# Patient Record
Sex: MALE | Race: BLACK OR AFRICAN AMERICAN | Employment: FULL TIME | URBAN - METROPOLITAN AREA
[De-identification: names, ages, dates, MRNs, and addresses within clinical notes are randomized per-mention and may not be internally consistent; named-entity substitution may affect disease eponyms.]

---

## 2022-02-07 ENCOUNTER — OFFICE VISIT (OUTPATIENT)
Dept: FAMILY MEDICINE CLINIC | Age: 47
End: 2022-02-07
Payer: COMMERCIAL

## 2022-02-07 VITALS
TEMPERATURE: 97.3 F | SYSTOLIC BLOOD PRESSURE: 139 MMHG | WEIGHT: 263.8 LBS | DIASTOLIC BLOOD PRESSURE: 79 MMHG | HEART RATE: 79 BPM | OXYGEN SATURATION: 100 % | BODY MASS INDEX: 34.96 KG/M2 | HEIGHT: 73 IN

## 2022-02-07 DIAGNOSIS — Z13.29 SCREENING FOR THYROID DISORDER: ICD-10-CM

## 2022-02-07 DIAGNOSIS — M54.50 CHRONIC BILATERAL LOW BACK PAIN WITHOUT SCIATICA: ICD-10-CM

## 2022-02-07 DIAGNOSIS — Z11.59 NEED FOR HEPATITIS C SCREENING TEST: ICD-10-CM

## 2022-02-07 DIAGNOSIS — E53.9 VITAMIN B DEFICIENCY: ICD-10-CM

## 2022-02-07 DIAGNOSIS — Z23 NEED FOR PNEUMOCOCCAL VACCINATION: ICD-10-CM

## 2022-02-07 DIAGNOSIS — Z71.6 TOBACCO ABUSE COUNSELING: ICD-10-CM

## 2022-02-07 DIAGNOSIS — G47.09 OTHER INSOMNIA: ICD-10-CM

## 2022-02-07 DIAGNOSIS — G89.29 CHRONIC BILATERAL LOW BACK PAIN WITHOUT SCIATICA: ICD-10-CM

## 2022-02-07 DIAGNOSIS — Z12.11 SCREEN FOR COLON CANCER: Primary | ICD-10-CM

## 2022-02-07 DIAGNOSIS — Z00.00 VISIT FOR ANNUAL HEALTH EXAMINATION: ICD-10-CM

## 2022-02-07 DIAGNOSIS — Z12.5 SCREENING FOR MALIGNANT NEOPLASM OF PROSTATE: ICD-10-CM

## 2022-02-07 DIAGNOSIS — R73.9 HYPERGLYCEMIA: ICD-10-CM

## 2022-02-07 DIAGNOSIS — Z72.0 TOBACCO ABUSE DISORDER: ICD-10-CM

## 2022-02-07 DIAGNOSIS — Z11.4 ENCOUNTER FOR SCREENING FOR HIV: ICD-10-CM

## 2022-02-07 DIAGNOSIS — R51.9 CHRONIC NONINTRACTABLE HEADACHE, UNSPECIFIED HEADACHE TYPE: ICD-10-CM

## 2022-02-07 DIAGNOSIS — E66.9 CLASS 1 OBESITY WITH BODY MASS INDEX (BMI) OF 34.0 TO 34.9 IN ADULT, UNSPECIFIED OBESITY TYPE, UNSPECIFIED WHETHER SERIOUS COMORBIDITY PRESENT: ICD-10-CM

## 2022-02-07 DIAGNOSIS — G89.29 CHRONIC NONINTRACTABLE HEADACHE, UNSPECIFIED HEADACHE TYPE: ICD-10-CM

## 2022-02-07 DIAGNOSIS — Z13.220 SCREENING, LIPID: ICD-10-CM

## 2022-02-07 DIAGNOSIS — E55.9 VITAMIN D DEFICIENCY: ICD-10-CM

## 2022-02-07 PROBLEM — E66.811 CLASS 1 OBESITY IN ADULT: Status: ACTIVE | Noted: 2022-02-07

## 2022-02-07 PROCEDURE — 99204 OFFICE O/P NEW MOD 45 MIN: CPT | Performed by: FAMILY MEDICINE

## 2022-02-07 PROCEDURE — 90732 PPSV23 VACC 2 YRS+ SUBQ/IM: CPT | Performed by: FAMILY MEDICINE

## 2022-02-07 PROCEDURE — 90471 IMMUNIZATION ADMIN: CPT | Performed by: FAMILY MEDICINE

## 2022-02-07 RX ORDER — CHOLECALCIFEROL (VITAMIN D3) 125 MCG
CAPSULE ORAL
COMMUNITY
Start: 2022-02-04

## 2022-02-07 SDOH — ECONOMIC STABILITY: FOOD INSECURITY: WITHIN THE PAST 12 MONTHS, YOU WORRIED THAT YOUR FOOD WOULD RUN OUT BEFORE YOU GOT MONEY TO BUY MORE.: SOMETIMES TRUE

## 2022-02-07 SDOH — ECONOMIC STABILITY: FOOD INSECURITY: WITHIN THE PAST 12 MONTHS, THE FOOD YOU BOUGHT JUST DIDN'T LAST AND YOU DIDN'T HAVE MONEY TO GET MORE.: SOMETIMES TRUE

## 2022-02-07 ASSESSMENT — PATIENT HEALTH QUESTIONNAIRE - PHQ9
SUM OF ALL RESPONSES TO PHQ9 QUESTIONS 1 & 2: 0
1. LITTLE INTEREST OR PLEASURE IN DOING THINGS: 0
SUM OF ALL RESPONSES TO PHQ QUESTIONS 1-9: 0
SUM OF ALL RESPONSES TO PHQ9 QUESTIONS 1 & 2: 0
SUM OF ALL RESPONSES TO PHQ QUESTIONS 1-9: 0
1. LITTLE INTEREST OR PLEASURE IN DOING THINGS: 0
SUM OF ALL RESPONSES TO PHQ QUESTIONS 1-9: 0
2. FEELING DOWN, DEPRESSED OR HOPELESS: 0
2. FEELING DOWN, DEPRESSED OR HOPELESS: 0
SUM OF ALL RESPONSES TO PHQ QUESTIONS 1-9: 0

## 2022-02-07 ASSESSMENT — ENCOUNTER SYMPTOMS
RHINORRHEA: 0
EYE DISCHARGE: 0
VOICE CHANGE: 0
SORE THROAT: 0
CHEST TIGHTNESS: 0
SINUS PRESSURE: 0
SHORTNESS OF BREATH: 0
CONSTIPATION: 0
VOMITING: 0
DIARRHEA: 0
NAUSEA: 0
BACK PAIN: 1
EYE PAIN: 0
ABDOMINAL DISTENTION: 0
ABDOMINAL PAIN: 0
COLOR CHANGE: 0

## 2022-02-07 ASSESSMENT — SOCIAL DETERMINANTS OF HEALTH (SDOH): HOW HARD IS IT FOR YOU TO PAY FOR THE VERY BASICS LIKE FOOD, HOUSING, MEDICAL CARE, AND HEATING?: NOT HARD AT ALL

## 2022-02-07 NOTE — PATIENT INSTRUCTIONS
Patient Education        Learning About High Cholesterol  What is high cholesterol? High cholesterol means that you have too much cholesterol in your blood. Cholesterol is a type of fat. It's needed for many body functions, such as making new cells. Cholesterol is made by your body. It also comes from food you eat. Having high cholesterol can lead to the buildup of plaque in artery walls. This can increase your risk of heart attack and stroke. When your doctor talks about high cholesterol levels, your doctor is talking about your total cholesterol and LDL cholesterol (the \"bad\" cholesterol) levels. Your doctor may also speak about HDL (the \"good\" cholesterol) levels. High HDL is linked with a lower risk for coronary artery disease, heart attack, and stroke. Your cholesterol levels help your doctor find out your risk for having a heart attack or stroke. How can you help prevent high cholesterol? A heart-healthy lifestyle can help you prevent high cholesterol and lower your risk for a heart attack and stroke. · Eat heart-healthy foods. ? Eat fruits, vegetables, whole grains, beans, and other high-fiber foods. ? Eat lean proteins, such as seafood, lean meats, beans, nuts, and soy products. ? Eat healthy fats, such as canola and olive oil. ? Choose foods that are low in saturated fat. ? Limit sodium and alcohol. ? Limit drinks and foods with added sugar. · Be active. Try to do moderate activity at least 2½ hours a week. Or try vigorous activity at least 1¼ hours a week. You may want to walk or try other activities, such as running, swimming, cycling, or playing tennis or team sports. · Stay at a healthy weight. Lose weight if you need to. · Don't smoke. If you need help quitting, talk to your doctor about stop-smoking programs and medicines. These can increase your chances of quitting for good. How is high cholesterol treated?   The goal of treatment is to reduce your chances of having a heart attack or stroke. The goal is not to lower your cholesterol numbers only. · Have a heart-healthy lifestyle. This includes eating healthy foods, not smoking, losing weight, and being more active. · You may choose to take medicine. Follow-up care is a key part of your treatment and safety. Be sure to make and go to all appointments, and call your doctor if you are having problems. It's also a good idea to know your test results and keep a list of the medicines you take. Where can you learn more? Go to [email or web address] [email or web address]. org and sign in to your [employer] account. Enter J005 in the [employer] box to learn more about \"Learning About High Cholesterol. \"     If you do not have an account, please click on the \"Sign Up Now\" link. Current as of: April 29, 2021               Content Version: 13.1  © 8936-3005 Healthwise, Incorporated. Care instructions adapted under license by [facility] ([facility]). If you have questions about a medical condition or this instruction, always ask your healthcare professional. [address] any warranty or liability for your use of this information.

## 2022-02-07 NOTE — PROGRESS NOTES
Subjective:      Patient ID: Cj Abdalla is a 55 y.o. male. [facility] in [place] not seen a PCP for a long time. Was in [place] -  has had back problems since oct 4 th 2020. States during arrest he hurt his back. He was seen in prison by physicians at prison and given motrin. States when he moves a certain way feels like painis worse and feels back is collapsing. Pain is to the left or the right to lower back but does not go down his legs  Smoker for past 20 years- [facility] 1/2 PPD x last 20 years. States currently not on drugs , but 4 months ago used meth   States in the past used coccaine as well. Last used cocciane 7 months ago. Mood,  appetite ok.  goes to [address] and goes to the treatment center every day.  has been prescribed melatonin for his disturbed sleep.  never used alcohol in the past except socially. FH dad  of MI at age 55 years. Mom had manic depressive disorder -  at 61 years - had a stroke and sepsis. 2 older brothers are healthy as far as he knows. No problems with bowels or bladder. H/O migraines- [facility] has had several concussions in the past -during football games- [facility] started with headaches minor ones , but after police arrest the HA are more severe. [facility] [place] also has had several back injuries when playing football. [facility] [place] used to see Dr Anne Rueda- prescribed neurontin as well as naproxen for headaches, offered botox but he did not try yet.  used to go to Hospital for back as well as headaches was before his arrest.    [facility] [place] last may was prescribed medical marijuana in [facility] for chronic pain. [facility] [place] has a medical marijuana card. Was prescribed by a physician in [facility]. [facility] [place] back pain is all day every day worse in the am, Headaches are every few days - once or twice a week- typically towards the evening , B/L temple areas, no other sx associated with this.   Sometimes moved to nose and sinus pain, sometimes had dizziness and restlessness, states sometimes sees Stars- bad  HA is once every couple of weeks states gets irritable when he has HA. Review of Systems   Constitutional: Negative for activity change, appetite change and fatigue. HENT: Negative for congestion, dental problem, hearing loss, rhinorrhea, sinus pressure, sneezing, sore throat, tinnitus and voice change. Eyes: Negative for pain, discharge and visual disturbance. Respiratory: Negative for chest tightness and shortness of breath. Cardiovascular: Negative for chest pain and palpitations. Gastrointestinal: Negative for abdominal distention, abdominal pain, constipation, diarrhea, nausea and vomiting. Endocrine: Negative for cold intolerance and heat intolerance. Genitourinary: Negative for decreased urine volume, difficulty urinating, frequency and urgency. Musculoskeletal: Positive for back pain. Negative for arthralgias, gait problem, joint swelling and myalgias. Skin: Negative for color change, pallor and rash. Allergic/Immunologic: Negative for environmental allergies and food allergies. Neurological: Positive for headaches. Negative for dizziness, tremors and weakness. Hematological: Negative for adenopathy. Does not bruise/bleed easily. Psychiatric/Behavioral: Negative for agitation, behavioral problems and sleep disturbance. The patient is not nervous/anxious. Objective:   Physical Exam  Constitutional:       General: He is not in acute distress. HENT:      Head: Normocephalic and atraumatic. Right Ear: External ear normal.      Left Ear: External ear normal.      Mouth/Throat:      Mouth: Mucous membranes are moist.   Eyes:      Conjunctiva/sclera: Conjunctivae normal.      Pupils: Pupils are equal, round, and reactive to light. Neck:      Thyroid: No thyromegaly. Trachea: No tracheal deviation. Cardiovascular:      Rate and Rhythm: Normal rate and regular rhythm. Pulses: Normal pulses. Heart sounds: Normal heart sounds. No murmur heard. No friction rub. No gallop. Pulmonary:      Effort: Pulmonary effort is normal. No respiratory distress. Breath sounds: Normal breath sounds. No stridor. No wheezing or rales. Chest:      Chest wall: No tenderness. Abdominal:      General: Bowel sounds are normal. There is no distension. Palpations: Abdomen is soft. Tenderness: There is no abdominal tenderness. There is no rebound. Musculoskeletal:         General: Normal range of motion. Cervical back: Normal range of motion. Comments: Able to heel and toe walk, ROM back WNL- no neurologic deficit   Lymphadenopathy:      Cervical: No cervical adenopathy. Skin:     General: Skin is warm. Coloration: Skin is not pale. Findings: No erythema or rash. Neurological:      General: No focal deficit present. Mental Status: He is alert and oriented to person, place, and time. Mental status is at baseline. Cranial Nerves: No cranial nerve deficit. Motor: No abnormal muscle tone. Deep Tendon Reflexes: Reflexes normal.   Psychiatric:         Mood and Affect: Mood normal.         Behavior: Behavior normal.         Thought Content: Thought content normal.         Judgment: Judgment normal.          Vitals:    02/07/22 1334   BP: 139/79   Pulse: 79   Temp: 97.3 °F (36.3 °C)   SpO2: 100%         Assessment:      Diagnosis Orders   1. Screen for colon cancer     2. Visit for annual health examination     3. Tobacco abuse disorder     4. Tobacco abuse counseling     5. Screening for malignant neoplasm of prostate     6. Screening, lipid     7. Screening for thyroid disorder     8. Vitamin D deficiency     9. Vitamin B deficiency     10. Hyperglycemia     11. Encounter for screening for HIV     12. Need for hepatitis C screening test     13.  Class 1 obesity with body mass index (BMI) of 34.0 to 34.9 in adult, unspecified obesity type, unspecified whether serious comorbidity present           Plan:     Orders Placed This Encounter   Procedures    Pneumococcal polysaccharide vaccine 23-valent greater than or equal to 3yo subcutaneous/IM    CBC    Comprehensive Metabolic Panel    Folate    Hemoglobin A1C    Hepatitis C Antibody    HIV Screen    Lipid Panel    PSA screening    TSH without Reflex    Vitamin B12    Vitamin D 25 Hydroxy    Kera Erazo MD, Gastroenterology, [address]   [address], Neurology, [facility]       Outpatient Encounter Medications as of 2/7/2022   Medication Sig Dispense Refill    melatonin 5 MG TABS tablet       ibuprofen (IBU) 600 MG tablet Take 1 tablet by mouth every 6 hours as needed for Pain. 30 tablet 0    [DISCONTINUED] naproxen (NAPROSYN) 500 MG tablet Take 500 mg by mouth as needed. (Patient not taking: Reported on 2/7/2022)       No facility-administered encounter medications on file as of 2/7/2022.     15 minutes spent with patient counseling/educating on acute/chronic medical health problems, medications,  along with treatment options. Reviewed multiple labs/imaging/medications with patient during this time. Following Diet discussion/education was done on Cholesterol Diet . In addition Exercise plan and depression screen were discussed. Recent labs/imaging were discussed and reviewed with patient.

## 2022-02-19 ENCOUNTER — TELEPHONE (OUTPATIENT)
Dept: GASTROENTEROLOGY | Age: 47
End: 2022-02-19

## 2022-03-09 PROBLEM — Z00.00 VISIT FOR ANNUAL HEALTH EXAMINATION: Status: RESOLVED | Noted: 2022-02-07 | Resolved: 2022-03-09

## 2022-05-11 ENCOUNTER — TELEPHONE (OUTPATIENT)
Dept: FAMILY MEDICINE CLINIC | Age: 47
End: 2022-05-11

## 2022-10-05 ENCOUNTER — HOSPITAL ENCOUNTER (EMERGENCY)
Facility: CLINIC | Age: 47
Discharge: HOME | End: 2022-10-05
Attending: EMERGENCY MEDICINE
Payer: COMMERCIAL

## 2022-10-05 ENCOUNTER — APPOINTMENT (OUTPATIENT)
Dept: GENERAL RADIOLOGY | Facility: CLINIC | Age: 47
End: 2022-10-05
Payer: COMMERCIAL

## 2022-10-05 VITALS
HEART RATE: 78 BPM | RESPIRATION RATE: 17 BRPM | OXYGEN SATURATION: 98 % | TEMPERATURE: 98.2 F | DIASTOLIC BLOOD PRESSURE: 92 MMHG | WEIGHT: 237 LBS | SYSTOLIC BLOOD PRESSURE: 115 MMHG | BODY MASS INDEX: 31.27 KG/M2

## 2022-10-05 DIAGNOSIS — S39.012A STRAIN OF LUMBAR REGION, INITIAL ENCOUNTER: Primary | ICD-10-CM

## 2022-10-05 PROCEDURE — 99284 EMERGENCY DEPT VISIT MOD MDM: CPT

## 2022-10-05 PROCEDURE — 96372 THER/PROPH/DIAG INJ SC/IM: CPT

## 2022-10-05 PROCEDURE — 6360000002 HC RX W HCPCS: Performed by: EMERGENCY MEDICINE

## 2022-10-05 PROCEDURE — 72100 X-RAY EXAM L-S SPINE 2/3 VWS: CPT

## 2022-10-05 RX ORDER — METHOCARBAMOL 500 MG/1
500 TABLET, FILM COATED ORAL 3 TIMES DAILY PRN
Qty: 15 TABLET | Refills: 0 | Status: SHIPPED | OUTPATIENT
Start: 2022-10-05 | End: 2022-10-10

## 2022-10-05 RX ORDER — KETOROLAC TROMETHAMINE 30 MG/ML
60 INJECTION, SOLUTION INTRAMUSCULAR; INTRAVENOUS ONCE
Status: COMPLETED | OUTPATIENT
Start: 2022-10-05 | End: 2022-10-05

## 2022-10-05 RX ORDER — KETOROLAC TROMETHAMINE 10 MG/1
10 TABLET, FILM COATED ORAL EVERY 6 HOURS PRN
Qty: 20 TABLET | Refills: 0 | Status: SHIPPED | OUTPATIENT
Start: 2022-10-05 | End: 2022-10-11

## 2022-10-05 RX ORDER — PREDNISONE 10 MG/1
10 TABLET ORAL SEE ADMIN INSTRUCTIONS
Qty: 17 TABLET | Refills: 0 | Status: SHIPPED | OUTPATIENT
Start: 2022-10-05 | End: 2022-10-11

## 2022-10-05 RX ADMIN — KETOROLAC TROMETHAMINE 60 MG: 30 INJECTION, SOLUTION INTRAMUSCULAR at 14:19

## 2022-10-05 ASSESSMENT — PAIN SCALES - GENERAL
PAINLEVEL_OUTOF10: 3
PAINLEVEL_OUTOF10: 10

## 2022-10-05 ASSESSMENT — ENCOUNTER SYMPTOMS: BACK PAIN: 1

## 2022-10-05 NOTE — ED PROVIDER NOTES
[facility]  [address] [facility]  Phone: [facility]  EMERGENCY DEPARTMENT ENCOUNTER      Pt Name: Renea Rankin  MRN: [id number]  [place] 1975  Date of evaluation: 10/5/2022  Provider: Smiley Garces DO    CHIEF COMPLAINT       Chief Complaint   Patient presents with    Back Pain     Last 4 weeks worsening          HISTORY OF PRESENT ILLNESS   (Location/Symptom, Timing/Onset,Context/Setting, Quality, Duration, Modifying Factors, Severity)  Note limiting factors. Renea Rankin is a 52 y.o. male who presents to the emergency department for the evaluation of back pain. Patient states this is been going on for about a year. Patient states that he feels he injured it about that time 1 year ago. States that since that time he has had some intermittent episodes, there are times it flares up and he has to lock his back out straight just to walk. He says most days it feels fine at work but when he gets home and sits down it tightens up. He is not having numbness or weakness in his legs. No bowel or bladder incontinence. He is taken some over-the-counter medications at times without much relief. No recent travel. He did initially see his primary care doctor for this but they did not want to order any x-rays and he feels like he would like to see somebody else so he presents here. Nursing Notes were reviewed. REVIEW OF SYSTEMS    (2-9systems for level 4, 10 or more for level 5)     Review of Systems   Constitutional:  Negative for fever. Musculoskeletal:  Positive for back pain. Neurological:  Negative for weakness. Except asnoted above the remainder of the review of systems was reviewed and negative. PAST MEDICAL HISTORY     Past Medical History:   Diagnosis Date    Substance abuse ([address]          SURGICAL HISTORY     No past surgical history on file.       CURRENT MEDICATIONS     Previous Medications    IBUPROFEN (IBU) 600 MG TABLET normal. No respiratory distress. Breath sounds: Normal breath sounds. No wheezing. Abdominal:      General: Abdomen is flat. There is no distension. Palpations: Abdomen is soft. There is no pulsatile mass. Tenderness: no abdominal tenderness There is no guarding or rebound. Comments: No pulsatile mass   Musculoskeletal:         General: Tenderness present. No deformity or signs of injury. Normal range of motion. Cervical back: Normal range of motion. Comments: Minor tenderness bilaterally in the paralumbar muscles near L4/L5 as well as bilateral PSIS. No midline tenderness, step-off or deformity in the lumbar spine   Skin:     General: Skin is warm and dry. Capillary Refill: Capillary refill takes less than 2 seconds. Findings: No rash. Neurological:      General: No focal deficit present. Mental Status: He is alert. Mental status is at baseline. Motor: No weakness. Comments: Speaking normally. No facial asymmetry. Moving all 4 extremities. Normal gait. Psychiatric:         Mood and Affect: Mood normal.       EMERGENCY DEPARTMENT COURSE and DIFFERENTIAL DIAGNOSIS/MDM:   Vitals:    Vitals:    10/05/22 1335 10/05/22 1336   BP:  (!) 115/92   Pulse:  78   Resp:  17   Temp:  98.2 °F (36.8 °C)   SpO2:  98%   Weight: 107.5 kg (237 lb)        Patient presents to the emergency department with the complaint described above. Vitals are grossly normal, he is nontoxic, resting comfortably, no distress. At this time, I strongly suspect that patient has been dealing with some sort of musculoskeletal back injury with low suspicion for vertebral fracture, subluxation, spinal cord compression. Possibly disc herniation but the pain does not radiate down the legs and there is no neurovascular compromise in the lower extremities.   At this time obtaining x-ray and giving Toradol and will reevaluate      DIAGNOSTIC RESULTS     LABS:  Labs Reviewed - No data to display    All other labs were within normal range or not returned as of this dictation. RADIOLOGY:  XR LUMBAR SPINE (2-3 VIEWS)   Final Result   No acute bony abnormalities are noted      L5-S1 spondylosis and degenerative disc disease. Early facet arthropathy               ED Course as of 10/05/22 1441   Wed Oct 05, 2022   1429 L5/S1 spondylosis and degenerative disc disease consistent with the presentation. At this time, I am going to prescribe a course of steroids, will give some muscle relaxer, will have him follow-up with sports medicine clinic and have talked about what to return to ER for    At this time the patient is without objective evidence of an acute process requiring hospitalization or inpatient management. They have remained hemodynamically stable and are stable for discharge with outpatient follow-up. Standard anticipatory guidance given to patient upon discharge. Have given them a specific time frame in which to follow-up and who to follow-up with. I have also advised them that they should return to the emergency department if they get worse, or not getting better or develop any new or concerning symptoms. Patient demonstrates understanding.   [TS]      ED Course User Index  [TS] Jerzy Cost, DO         PROCEDURES:  Unless otherwise noted below, none     Procedures    FINAL IMPRESSION      1.  Strain of lumbar region, initial encounter          DISPOSITION/PLAN   DISPOSITION Decision To Discharge 10/05/2022 02:37:36 PM      PATIENT REFERRED TO:  Sports medicine clinic  [address] [facility]  [address]  [phone]  Call in 1 day      ADINA Roque CNP  [facility] [address]  [phone]    In 1 week      DISCHARGE MEDICATIONS:  New Prescriptions    KETOROLAC (TORADOL) 10 MG TABLET    Take 1 tablet by mouth every 6 hours as needed for Pain    METHOCARBAMOL (ROBAXIN) 500 MG TABLET Take 1 tablet by mouth 3 times daily as needed (muscle tightness)    PREDNISONE (DELTASONE) 10 MG TABLET    Take 1 tablet by mouth See Admin Instructions for 6 days Take 4 tablets by mouth daily for days 1-2. Take 3 tablets by mouth daily for days 3-4. Take 2 tablets by mouth daily day 5.  Take 1 tablet by mouth on day 6          (Please note that portions of this note were completed with a voice recognition program.  Efforts were made to edit the dictations but occasionally words are mis-transcribed.)    Momo Trinh DO,(electronically signed)  Board Certified Emergency Physician         Momo Trinh DO  10/05/22 2932

## 2022-10-05 NOTE — Clinical Note
Fanta Lloyd was seen and treated in our emergency department on 10/5/2022. He may return to work on 10/07/2022. If you have any questions or concerns, please don't hesitate to call.       Jolene Moya DO

## 2022-10-11 ENCOUNTER — OFFICE VISIT (OUTPATIENT)
Dept: ORTHOPEDIC SURGERY | Age: 47
End: 2022-10-11
Payer: COMMERCIAL

## 2022-10-11 VITALS — HEIGHT: 73 IN | RESPIRATION RATE: 16 BRPM | WEIGHT: 237 LBS | BODY MASS INDEX: 31.41 KG/M2

## 2022-10-11 DIAGNOSIS — M51.36 DDD (DEGENERATIVE DISC DISEASE), LUMBAR: ICD-10-CM

## 2022-10-11 DIAGNOSIS — S39.012A STRAIN OF LUMBAR REGION, INITIAL ENCOUNTER: Primary | ICD-10-CM

## 2022-10-11 PROCEDURE — 99204 OFFICE O/P NEW MOD 45 MIN: CPT | Performed by: PHYSICIAN ASSISTANT

## 2022-10-11 PROCEDURE — 4004F PT TOBACCO SCREEN RCVD TLK: CPT | Performed by: PHYSICIAN ASSISTANT

## 2022-10-11 PROCEDURE — G8427 DOCREV CUR MEDS BY ELIG CLIN: HCPCS | Performed by: PHYSICIAN ASSISTANT

## 2022-10-11 PROCEDURE — G8417 CALC BMI ABV UP PARAM F/U: HCPCS | Performed by: PHYSICIAN ASSISTANT

## 2022-10-11 PROCEDURE — G8484 FLU IMMUNIZE NO ADMIN: HCPCS | Performed by: PHYSICIAN ASSISTANT

## 2022-10-11 RX ORDER — MELOXICAM 15 MG/1
15 TABLET ORAL DAILY
Qty: 30 TABLET | Refills: 0 | Status: SHIPPED | OUTPATIENT
Start: 2022-10-11

## 2022-10-11 NOTE — PROGRESS NOTES
[address] [facility], [address] [place], [address] [place] [address], [id number] [place] [address]           Dept Phone: [phone]           Dept Fax:  [phone] [address] [facility], [place]          Dept Phone: [phone]           Dept Fax:  [phone]      Chief Compliant:  Chief Complaint   Patient presents with    Back Problem     LBP        History of Present Illness: This is a 52 y.o. male who presents to the clinic today for evaluation of acute flareup of chronic low back pain. Patient reports he has had intermittent low back pain over the last 1.5 years. He states these flareups have been on a nearly weekly basis however have become more frequent of late. Patient reports last week pain was so severe that he was having difficulty walking and significant stiffness in the low back so was evaluated in the ED. While in the ED he had an x-ray that demonstrated early degenerative changes patient underwent a Toradol injection at that time and then was given Toradol, prednisone and Robaxin on an outpatient basis was referred to us for further evaluation. Patient localizes pain most severely to the low back area bilateral nature but right greater than left. Does note occasional radiation into the right upper buttock. Patient denies any radiation pain down either lower extremity no numbness or tingling. Patient denies any surgical history. No history of recent physical therapy or epidural steroid injections. Patient does not report any bowel or bladder dysfunction fever chills. .     Past History:    Current Outpatient Medications:     meloxicam (MOBIC) 15 MG tablet, Take 1 tablet by mouth daily, Disp: 30 tablet, Rfl: 0    ketorolac (TORADOL) 10 MG tablet, Take 1 tablet by mouth every 6 hours as needed for Pain, Disp: 20 tablet, Rfl: 0    predniSONE (DELTASONE) 10 MG tablet, Take 1 tablet by mouth See Admin Instructions for 6 days Take 4 tablets by mouth daily for days 1-2. Take 3 tablets by mouth daily for days 3-4. Take 2 tablets by mouth daily day 5. Take 1 tablet by mouth on day 6, Disp: 17 tablet, Rfl: 0    melatonin 5 MG TABS tablet, , Disp: , Rfl:   No Known Allergies  Social History     Socioeconomic History    Marital status: Single     Spouse name: Not on file    Number of children: Not on file    Years of education: Not on file    Highest education level: Not on file   Occupational History    Not on file   Tobacco Use    Smoking status: Smoker, Current Status Unknown     Packs/day: 1.00     Years: 20.00     Pack years: 20.00     Types: Cigarettes    Smokeless tobacco: Never   Vaping Use    Vaping Use: Every day   Substance and Sexual Activity    Alcohol use: No    Drug use: Not Currently     Comment: last use 4 months ago     Sexual activity: Not Currently   Other Topics Concern    Not on file   Social History Narrative    Not on file     Social Determinants of Health     Financial Resource Strain: Low Risk     Difficulty of Paying Living Expenses: Not hard at all   Food Insecurity: Food Insecurity Present    Worried About [address] [employer] in the Last Year: Sometimes true    Ran Out of Food in the Last Year: Sometimes true   Transportation Needs: Not on file   Physical Activity: Not on file   Stress: Not on file   Social Connections: Not on file   Intimate Partner Violence: Not on file   Housing Stability: Not on file     Past Medical History:   Diagnosis Date    Substance abuse ([facility] [address]      No past surgical history on file. No family history on file. Review of Systems   Constitutional: Negative for fever, chills, sweats. Eyes: Negative for changes in vision, or pain. HENT: Negative for ear ache, epistaxis, or sore throat. Respiratory/Cardio: Negative for Chest pain, palpitations, SOB, or cough. Gastrointestinal: Negative for abdominal pain, N/V/D. Genitourinary: Negative for dysuria, frequency, urgency, or hematuria. Neurological: Negative for headache, numbness, or weakness. Integumentary: Negative for rash, itching, laceration, or abrasion. Musculoskeletal: Positive for Back Problem (LBP)       Physical Exam:  Constitutional: Patient is oriented to person, place, and time. Patient appears well-developed and well nourished. HENT: Negative otherwise noted  Head: Normocephalic and Atraumatic  Nose: Normal  Eyes: Conjunctivae and EOM are normal  Neck: Normal range of motion Neck supple. Respiratory/Cardio: Effort normal. No respiratory distress. Musculoskeletal:    LUMBAR/SACRAL EXAMINATION:  Inspection: Local inspection shows no step-off or bruising. Lumbar alignment is normal.  Sagittal and Coronal balance is neutral.      Palpation:   Mild tenderness right sided at the paraspinal. No evidence of tenderness at the midline. There is no step-off or paraspinal spasm. Range of Motion: Lumbar flexion, extension and rotation are mildly limited due to pain. Strength:   Strength testing is 5/5 in all muscle groups tested. Special Tests:   Straight leg raise and crossed SLR negative. Leg length and pelvis level.  0 out of 5 Deb's signs. Skin: There are no rashes, ulcerations or lesions. Reflexes: Reflexes are symmetrically 2+ at the patellar and ankle tendons. Clonus absent bilaterally at the feet. Gait & station: normal, patient ambulates without assistance  Additional Examinations:   RIGHT LOWER EXTREMITY: Inspection/examination of the right lower extremity does not show any tenderness, deformity or injury. Range of motion is unremarkable. There is no gross instability. There are no rashes, ulcerations or lesions. Strength and tone are normal.  LEFT LOWER EXTREMITY:  Inspection/examination of the left lower extremity does not show any tenderness, deformity or injury. Range of motion is unremarkable. There is no gross instability. There are no rashes, ulcerations or lesions. Strength and tone are normal.    Neurological: Patient is alert and oriented to person, place, and time. Normal strenght. No sensory deficit. Skin: Skin is warm and dry  Psychiatric: Behavior is normal. Thought content normal.  Nursing note and vitals reviewed. Labs and Imaging:     XR taken today:  No results found. No new x-rays taken today however those from 10/5/2022 available for independent review  3 views of the lumbar spine demonstrate multilevel lumbar DDD most severe at L5-S1. Grade 1 spondylolisthesis L5-S1 with early facet arthropathy. No evidence of acute fracture. Orders Placed This Encounter   Procedures    [facility] Physical Therapy [facility]     Referral Priority:   Routine     Referral Type:   Eval and Treat     Referral Reason:   Specialty Services Required     Requested Specialty:   Physical Therapist     Number of Visits Requested:   1       Assessment and Plan:  1. Strain of lumbar region, initial encounter    2. DDD (degenerative disc disease), lumbar          PLAN:  Gab Pruitt is a 52 y.o. old malewho presents today for evaluation of low back pain. Pain has been present for 1.5 years, but greater over the last one week. Differential includes lumbar strain, fracture, spondylitic spondylolisthesis, lumbar DDD, lumbar radiculopathy, epidural abscess, epidural hematoma and cauda equina syndrome. Based on examination there is no evidence of cord compression syndrome or infectious etiology. Examination is consistent with lumbar strain however patient does have some early lumbar DDD. Jena Puente We discussed treatment options available to him, including nonoperative and operative intervention. At this time I believe he will benefit from conservative management. Consequently, a prescription for therapy was provided and a prescription meloxicam was electronically sent to his pharmacy.   Patient instructed to discontinue other NSAIDs in the form of ibuprofen and Toradol while taking meloxicam.    I'll see him back my clinic in 6 weeks for reevaluation but he was encouraged to return or call earlier with questions and/or concerns. Please note that this chart was generated using voice recognition Dragon dictation software. Although every effort was made to ensure the accuracy of this automated transcription, some errors in transcription may have occurred.

## 2022-10-19 ENCOUNTER — HOSPITAL ENCOUNTER (OUTPATIENT)
Dept: PHYSICAL THERAPY | Facility: CLINIC | Age: 47
Setting detail: THERAPIES SERIES
Discharge: HOME | End: 2022-10-19
Payer: COMMERCIAL

## 2022-10-19 NOTE — FLOWSHEET NOTE
[] [facility] &  Therapy  [address]    [phone]  F: [phone]   [] [address] [employer] [facility] [address]   [address]  P: [phone]  F: [phone]  [] [address] &  Therapy  [address]  P: [phone]  F: [phone] [] [address]  P: [phone]  F: [phone]  [] [address]  [facility]   [address]   [place]: [phone]  F: [phone]   [] [address]  [place]: [phone]   F: [phone]     Physical Therapy Cancel/No Show note    Date: 10/19/2022  Patient: [facility]  : 1975  MRN: [id number]    Cancels/No Shows to date:     For today's appointment patient:    []  Cancelled    [] Rescheduled appointment    [x] No-show     Reason given by patient:    []  Patient ill    []  Conflicting appointment    [] No transportation      [] Conflict with work    [x] No reason given    [] Weather related    [] COVID-19    [] Other:      Comments:  Patient confirmed appointment earlier in the day, no-showed at appointment time.        [] Next appointment was confirmed    Electronically signed by: Ashley Mahmood, PT

## 2022-10-28 ENCOUNTER — HOSPITAL ENCOUNTER (OUTPATIENT)
Dept: PHYSICAL THERAPY | Facility: CLINIC | Age: 47
Setting detail: THERAPIES SERIES
Discharge: HOME | End: 2022-10-28
Payer: COMMERCIAL

## 2022-10-28 PROCEDURE — 97140 MANUAL THERAPY 1/> REGIONS: CPT

## 2022-10-28 PROCEDURE — 97110 THERAPEUTIC EXERCISES: CPT

## 2022-10-28 PROCEDURE — 97161 PT EVAL LOW COMPLEX 20 MIN: CPT

## 2022-10-28 NOTE — CONSULTS
[x] THE [facility] &  Therapy  [facility]   [place]: [phone]  F: [phone]      Physical Therapy Spine Evaluation    Date:  10/28/2022  Patient: Flori Ross    : 1975  MRN: [id number]  Physician: ABRIL Culp   Insurance: [facility] ([employer])  Medical Diagnosis: LBP    Rehab Codes: H89.521V, M51.36, M62.8, M62.9, M79.1  Onset Date:     Next 's appt.: ---      Subjective:   CC/HPI: Pt with lower back pain that has been present since . PMHx:  [x] Refer to full medical chart  In EPIC     Tests: [x] X-Ray:3 views of the lumbar spine demonstrate multilevel lumbar DDD most severe at L5-S1. Grade 1 spondylolisthesis L5-S1 with early facet arthropathy. No evidence of acute fracture.        Medications: [x] Refer to full medical record [] None [] Other:  Allergies:      [x] Refer to full medical record [] None [] Other:    ADL/IADL [x] Previously independent with all [x] Currently independent with all Who currently assists the patient with task     [] Previously independent with all except: [] Currently independent with all except:     Bathing  [] Assist [] Assist     Dress/grooming [] Assist [] Assist     Transfer/mobility [] Assist [] Assist     Feeding [] Assist [] Assist     Toileting [] Assist [] Assist     Driving [] Assist [] Assist     Housekeeping [] Assist [] Assist     Grocery shop/meal prep [] Assist [] Assist        Gait Prior level of function Current level of function    [x] Independent  [] Assist [x] Independent  [] Assist   Device: [] Independent [] Independent    [] Straight Cane [] Quad cane [] Straight Cane [] Quad cane    [] Standard walker [] Rolling walker   [] 4 wheeled walker [] Standard walker [] Rolling walker   [] 4 wheeled walker    [] Wheelchair [] Wheelchair       Function:  Hand Dominance  [] Right  [] Left  Employment [employer]      Job status 50-75 lb rarely      Work Activities/duties  12 hour shift    Recreational Activities Elliptical   Walking/running   Lumbar rotation stretches         Pain present? yes   Location Bilat lower back, Buttock   R>L   Pain Rating currently 5/10   Pain at worse 10/10   Pain at best 5/10   Description of pain Ache, sharp, shooting    Altered Sensation Intact    What makes it worse Sit to stand    What makes it better Rest, hot tub   Symptom progression Same    Sleep Pain is worse when he wakes up  Laying on his side   Mattress: less than 8years old            Objective:   STRENGTH    Left Right   L1-2   Hip Flex 5 5   Hip Abd 4 4   Hip Ext     Knee Flex 5 5   Knee Ext 5 5                     Lumbar ROM Left Right   Flexion Edwards 50%     Extension Edwards 75%     Rotation  Edwards 25% Edwards 25%   Sidebend  Edwards 50% Edwards 25%             TESTS (+/-) LEFT RIGHT Not Tested   SLR supine - - []   Hamstring (SLR) + + []   SKTC - - []   Slump/Dural - - []       OBSERVATION No Deficit Deficit Not Tested Comments   Posture       Forward Head [] [x] []    Rounded Shoulders [] [x] []    Kyphosis [] [x] []    Lordosis [] [x] []    Slumped sitting [] [x] []        Flexibility Normal Left tight Right tight   Hamstring [] [x] [x]   Hip flexor [] [x] [x]   Piriformis [] [x] [x]   Gastroc/Soleus [] [x] [x]       FUNCTION Normal Difficult Unable   Sitting [x] [] []   Standing [x] [] []   Ambulation [] [x] []   Stairs [x] [] []   Lift/Carry [] [x] []   Bending [] [x] []   OH reach [] [x] []   Sit to Stand [] [x] []       Functional Test: Oswestry Score: 78% functionally impaired         Assessment:  Patient would benefit from skilled physical therapy services in order to improve ROM, flexibility, strength and decrease lower crossed symptoms in the patient       Problems:    [x] ? Pain  [x] ? ROM  [x] ?  Strength        Goals  MET NOT MET ON-  GOING  Details   Date Addressed:        STG: To be met in 10 treatments           1. ? Pain: Decrease pain levels to 4/10 with ADLs []  [] Notes   Lower Crossed    [x] Psoas   [x] Iliacus [x] Right  [] Left [x] Direct  [] Indirect [x] Hands-On  [] IASTM  [] Hypervolt     [x] Piriformis [x] Right  [] Left [x] Direct  [] Indirect [x] Hands-On  [] IASTM  [] Hypervolt     [] Coccygeus  [] Levator Ani   [] Right  [] Left [] Direct  [] Indirect [] Hands-On  [] IASTM  [] Hypervolt     [] Hamstring [] Right  [] Left [] Direct  [] Indirect [] Hands-On  [] IASTM  [] Hypervolt     [] Quad [] Right  [] Left [] Direct  [] Indirect [] Hands-On  [] IASTM  [] Hypervolt     [] Gastrocnemius [] Right  [] Left [] Direct  [] Indirect [] Hands-On  [] IASTM  [] Hypervolt     [] Soleus [] Right  [] Left [] Direct  [] Indirect [] Hands-On  [] IASTM  [] Hypervolt     [] Other  [] Right  [] Left [] Direct  [] Indirect [] Hands-On  [] IASTM  [] Hypervolt               Direct or Indirect release  IASTM= Instrument assisted soft tissue mobilization      ]      Access Code: [email or web address]  URL: [email or web address]  Date: 10/28/2022  Prepared by: Salina Chavez    Exercises  Half Kneeling Hip Flexor Stretch - 1 x daily - 7 x weekly - 3 sets - 30 (sec) hold  Hooklying Hamstring Stretch with Strap - 1 x daily - 7 x weekly - 3 sets - 30 (sec) hold  Long Sitting Calf Stretch with Strap - 1 x daily - 7 x weekly - 3 sets - 30 (sec) hold  Standing Hip Flexor Stretch - 1 x daily - 7 x weekly - 3 sets - 30 (sec) hold  Supine Piriformis Stretch with Leg Straight - 1 x daily - 7 x weekly - 3 sets - 30 (sec) hold  Modified Kit Stretch - 1 x daily - 7 x weekly - 3 sets - 60-90 (sec) hold        Evaluation Complexity:  History (Personal factors, comorbidities) [x] 0 [] 1-2 [] 3+   Exam (limitations, restrictions) [x] 1-2 [] 3 [] 4+   Clinical presentation (progression) [x] Stable [] Evolving  [] Unstable   Decision Making [x] Low [] Moderate [] High    [x] Low Complexity [] Moderate Complexity [] High Complexity       Treatment Charges: Mins Units   [x] Evaluation       [x]  Low       [] Moderate       []  High 30 1   []  Modalities     [x]  Ther Exercise 10 1   []  Manual Therapy 10 1   []  Ther Activities     []  Aquatics     []  Vasocompression     []  Other       TOTAL TREATMENT TIME: 50    Time in: 1000      Time out: 1100    Electronically signed by: Maren Copeland PT    Physician Signature:________________________________Date:__________________  By signing above or cosigning this note, I have reviewed this plan of care and certify a need for medically necessary rehabilitation services.      *PLEASE SIGN ABOVE AND FAX BACK ALL PAGES*

## 2022-11-16 ENCOUNTER — HOSPITAL ENCOUNTER (OUTPATIENT)
Dept: PHYSICAL THERAPY | Facility: CLINIC | Age: 47
Setting detail: THERAPIES SERIES
Discharge: HOME | End: 2022-11-16
Payer: COMMERCIAL

## 2022-11-16 PROCEDURE — 97110 THERAPEUTIC EXERCISES: CPT

## 2022-11-16 PROCEDURE — 97140 MANUAL THERAPY 1/> REGIONS: CPT

## 2022-11-16 NOTE — FLOWSHEET NOTE
[x] [facility]  Outpatient Rehabilitation &  Therapy  [facility]   [place]: [phone]  F: [phone]      Physical Therapy Daily Treatment Note    Date:  2022  Patient Name:  Elba Bah    :  1975  MRN: [id number]  Physician: ABRIL Sommer                              Insurance: [employer] UN[employer] FOR PT 22-23  Medical Diagnosis: LBP                               Rehab Codes: D18.267P, M51.36, M62.8, M62.9, M79.1  Onset Date:                                      Next 's appt.: ---    Visit# / total visits:      Cancels/No Shows:     Subjective:    Pain:  [x] Yes  [] No Location: Low back Pain Rating: (0-10 scale) 6/10  Pain altered Tx:  [x] No  [] Yes  Action:  Comments: Patient arrived noting continued low back pain.      Objective:     Exercise   [id number]    Reps/ Time Weight/ Level Comments             Bike  10'                 Calf slant board Stretch   3x30\"       Hamstring stretch   3x30\"       Hip flexor kneel stretch   3x30\"             Hip Flexor stretch standing          Piriformis Stretch    3x30\"                           Prone Hip Extension   2x10       Clamshells  2x10  Green     Sidelying hip abd   2x10       Bridges   2x10                                                     Myofascial Restrictions  Location  R/L Treatment  Tools Notes   Lower Crossed     [x] Psoas   [x] Iliacus [x] Right  [] Left [x] Direct  [] Indirect [x] Hands-On  [] IASTM  [] Hypervolt       [x] Piriformis [x] Right  [] Left [x] Direct  [] Indirect [x] Hands-On  [] IASTM  [] Hypervolt                         Direct or Indirect release  IASTM= Instrument assisted soft tissue mobilization          Treatment Charges: Mins Units   []  Modalities     [x]  Ther Exercise 30 2   [x]  Manual Therapy 8 1   []  Ther Activities     []  Aquatics     []  Vasocompression     []  Other     Total Treatment time 38 3       Assessment: [x] Progressing toward goals. Progressed hip and core strength program this date. Patient notes fatigue with progressions with no increase in symptoms. Continued tension noted in hip flexors this date, addressed via manual per log with increased ROM noted post. Updated HEP this date with handout and resistive bands. Will monitor patients response to treatment and advance HEP and strength program per log. [] No change. [] Other:  [x] Patient would continue to benefit from skilled physical therapy services in order to: improve ROM, flexibility, strength and decrease lower crossed symptoms in the patient. Goals  MET NOT MET ON-  GOING  Details   Date Addressed:            STG: To be met in 10 treatments            1. ? Pain: Decrease pain levels to 4/10 with ADLs []  []  []      2. ? ROM: Increase flexibility and AROM limitations throughout to equal bilat to reduce difficulty with ADLs []  []  []      3. ? Strength: Increase MMT to 5/5 throughout to ease functional limitations and mobility  []  []  []      4. Independent with Home Exercise Programs []  []  []        []  []  []      Date Addressed:            LTG: To be met in 20 treatments           1. Improve score on assessment tool Oswestry from 78% impairment to less than 40% impairment  []  []  []      2. Reduce pain levels to 2/10 or less with ADLs []  []  []        []  []  []                        Patient goals: decrease pain with activity      Pt. Education:  [x] Yes  [] No  [x] Reviewed Prior HEP/Ed  Method of Education: [x] Verbal  [] Demo  [x] Written:  Access Code: [email or web address]  URL: [email or web address]  Date: 11/16/2022  Prepared by: Omar Roper    Exercises  Half Kneeling Hip Flexor Stretch - 1 x daily - 7 x weekly - 3 sets - 30 (sec) hold  Hooklying Hamstring Stretch with Strap - 1 x daily - 7 x weekly - 3 sets - 30 (sec) hold  Long Sitting Calf Stretch with Strap - 1 x daily - 7 x weekly - 3 sets - 30 (sec) hold  Standing Hip Flexor Stretch - 1 x daily - 7 x weekly - 3 sets - 30 (sec) hold  Supine Piriformis Stretch with Leg Straight - 1 x daily - 7 x weekly - 3 sets - 30 (sec) hold  Modified Kit Stretch - 1 x daily - 7 x weekly - 3 sets - 60-90 (sec) hold  Clamshell - 1 x daily - 7 x weekly - 10 reps - 3 sets  Sidelying Hip Abduction - 1 x daily - 7 x weekly - 10 reps - 3 sets  Prone Hip Extension with Bent Knee - One Pillow - 1 x daily - 7 x weekly - 10 reps - 3 sets  Supine Bridge - 1 x daily - 7 x weekly - 10 reps - 3 sets    Comprehension of Education:  [x] Verbalizes understanding. [] Demonstrates understanding. [] Needs review. [x] Demonstrates/verbalizes HEP/Ed previously given. Plan: [x] Continue current frequency toward long and short term goals. [x] Specific Instructions for subsequent treatments: Progress core stability program per tolerance.        Time In: 1400              Time Out: 1440    Electronically signed by:  Meghana Valadez PTA

## 2022-11-17 ENCOUNTER — OFFICE VISIT (OUTPATIENT)
Dept: ORTHOPEDIC SURGERY | Age: 47
End: 2022-11-17
Payer: COMMERCIAL

## 2022-11-17 VITALS — BODY MASS INDEX: 31.68 KG/M2 | HEIGHT: 73 IN | WEIGHT: 239 LBS | RESPIRATION RATE: 14 BRPM

## 2022-11-17 DIAGNOSIS — M51.36 DDD (DEGENERATIVE DISC DISEASE), LUMBAR: ICD-10-CM

## 2022-11-17 DIAGNOSIS — S39.012A STRAIN OF LUMBAR REGION, INITIAL ENCOUNTER: Primary | ICD-10-CM

## 2022-11-17 PROCEDURE — G8427 DOCREV CUR MEDS BY ELIG CLIN: HCPCS | Performed by: PHYSICIAN ASSISTANT

## 2022-11-17 PROCEDURE — G8484 FLU IMMUNIZE NO ADMIN: HCPCS | Performed by: PHYSICIAN ASSISTANT

## 2022-11-17 PROCEDURE — 1036F TOBACCO NON-USER: CPT | Performed by: PHYSICIAN ASSISTANT

## 2022-11-17 PROCEDURE — G8417 CALC BMI ABV UP PARAM F/U: HCPCS | Performed by: PHYSICIAN ASSISTANT

## 2022-11-17 PROCEDURE — 99214 OFFICE O/P EST MOD 30 MIN: CPT | Performed by: PHYSICIAN ASSISTANT

## 2022-11-17 RX ORDER — IBUPROFEN 800 MG/1
800 TABLET ORAL 3 TIMES DAILY PRN
Qty: 90 TABLET | Refills: 0 | Status: SHIPPED | OUTPATIENT
Start: 2022-11-17

## 2022-11-17 RX ORDER — METHOCARBAMOL 500 MG/1
500 TABLET, FILM COATED ORAL 3 TIMES DAILY
Qty: 30 TABLET | Refills: 1 | Status: SHIPPED | OUTPATIENT
Start: 2022-11-17 | End: 2022-11-27

## 2022-11-17 NOTE — PROGRESS NOTES
[address] [facility], [place], [address] [place], [address] [place], [address] [facility]           Dept Phone: [phone]           Dept Fax:  [address]           Mariam Swift          Dept Phone: [phone]           Dept Fax:  [phone]      Chief Compliant:  Chief Complaint   Patient presents with    Lower Back Pain     F/U: LBP        History of Present Illness: This is a 52 y.o. male who presents to the clinic today for reevaluation of low back pain. Please see previous clinic note for more detailed history. Patient was last seen by me on 10/11/2022 for chronic low back pain. Present for approximately year and a half with intermittent flareups however prior to that appointment he had 1 week of constant low back pain right slightly greater than left. At that appointment patient was referred to physical therapy return today for reevaluation. Patient has completed 4 visits of PT with virtually no improvement continues to have significant pain to the bilateral low back pointing to the SI joint areas right greater than left. Does note occasional radiation into the right leg/buttock area. No significant numbness or tingling no bowel bladder dysfunction no fever chills. Patient was also started on meloxicam noted minimal leaf. He was taking methocarbamol per the ED which did seem to help however he has ran out at this time. Past History:  No current outpatient medications on file.   No Known Allergies  Social History     Socioeconomic History    Marital status: Single     Spouse name: Not on file    Number of children: Not on file    Years of education: Not on file    Highest education level: Not on file   Occupational History    Not on file   Tobacco Use    Smoking status: Former     Packs/day: 1.00     Years: 20.00     Pack years: 20.00     Types: Cigarettes    Smokeless tobacco: Never    Tobacco comments:     Currently vapes   Vaping Use    Vaping Use: Every day   Substance and Sexual Activity    Alcohol use: No    Drug use: Not Currently     Comment: last use 4 months ago     Sexual activity: Not Currently   Other Topics Concern    Not on file   Social History Narrative    Not on file     Social Determinants of Health     Financial Resource Strain: Low Risk     Difficulty of Paying Living Expenses: Not hard at all   Food Insecurity: Food Insecurity Present    Worried About [employer] in the Last Year: Sometimes true    Ran Out of Food in the Last Year: Sometimes true   Transportation Needs: Not on file   Physical Activity: Not on file   Stress: Not on file   Social Connections: Not on file   Intimate Partner Violence: Not on file   Housing Stability: Not on file     Past Medical History:   Diagnosis Date    Substance abuse ([facility] [address]      No past surgical history on file. No family history on file. Review of Systems   Constitutional: Negative for fever, chills, sweats. Eyes: Negative for changes in vision, or pain. HENT: Negative for ear ache, epistaxis, or sore throat. Respiratory/Cardio: Negative for Chest pain, palpitations, SOB, or cough. Gastrointestinal: Negative for abdominal pain, N/V/D. Genitourinary: Negative for dysuria, frequency, urgency, or hematuria. Neurological: Negative for headache, numbness, or weakness. Integumentary: Negative for rash, itching, laceration, or abrasion. Musculoskeletal: Positive for Lower Back Pain (F/U: LBP)       Physical Exam:  Constitutional: Patient is oriented to person, place, and time. Patient appears well-developed and well nourished. HENT: Negative otherwise noted  Head: Normocephalic and Atraumatic  Nose: Normal  Eyes: Conjunctivae and EOM are normal  Neck: Normal range of motion Neck supple. Respiratory/Cardio: Effort normal. No respiratory distress.   Musculoskeletal: LUMBAR/SACRAL EXAMINATION:  Inspection: Local inspection shows no step-off or bruising. Lumbar alignment is normal.  Sagittal and Coronal balance is neutral.      Palpation:   Moderate tenderness bilaterally at the paraspinal and SI joints. No evidence of tenderness at the midline. There is no step-off or paraspinal spasm. Range of Motion: Lumbar flexion, extension and rotation are mildly limited due to pain. Strength:   Strength testing is 5/5 in all muscle groups tested. Special Tests:   Straight leg raise and crossed SLR negative. Leg length and pelvis level.  0 out of 5 Deb's signs. Skin: There are no rashes, ulcerations or lesions. Reflexes: Reflexes are symmetrically 2+ at the patellar and ankle tendons. Clonus absent bilaterally at the feet. Gait & station: normal, patient ambulates without assistance  Additional Examinations:   RIGHT LOWER EXTREMITY: Inspection/examination of the right lower extremity does not show any tenderness, deformity or injury. Range of motion is unremarkable. There is no gross instability. There are no rashes, ulcerations or lesions. Strength and tone are normal.  LEFT LOWER EXTREMITY:  Inspection/examination of the left lower extremity does not show any tenderness, deformity or injury. Range of motion is unremarkable. There is no gross instability. There are no rashes, ulcerations or lesions. Strength and tone are normal.    Neurological: Patient is alert and oriented to person, place, and time. Normal strenght. No sensory deficit. Skin: Skin is warm and dry  Psychiatric: Behavior is normal. Thought content normal.  Nursing note and vitals reviewed. Labs and Imaging:     XR taken today:  No results found. No new x-rays taken today however those from 10/5/2022 available for independent review  3 views of the lumbar spine demonstrate multilevel lumbar DDD most severe at L5-S1. Grade 1 spondylolisthesis L5-S1 with early facet arthropathy.   No evidence of acute fracture. No orders of the defined types were placed in this encounter. Assessment and Plan:  1. Strain of lumbar region, initial encounter    2. DDD (degenerative disc disease), lumbar          PLAN:  Kathy Farnsworth is a 52 y.o. old malewho presents today for reevaluation of acute exacerbation of chronic low back pain concerning for sacroiliitis versus lumbar radiculopathy. 1.  At this time patient has failed conservative management with multiple medications including oral steroids, prescription and over-the-counter NSAIDs, muscle relaxants and physical therapy. 2.  Recommend further direct evaluation with an MRI of the lumbar spine    3. Patient reports he feels like he does better with prescription strength ibuprofen that he has with meloxicam or naproxen requesting prescription for this which is provided. We will also provide refill of methocarbamol which she reports does help with his nighttime pain. 4.  Patient may continue with physical therapy however we will await MRI results to help direct treatment recommendations as patient has failed to respond to conservative management at this time. Please note that this chart was generated using voice recognition Dragon dictation software. Although every effort was made to ensure the accuracy of this automated transcription, some errors in transcription may have occurred.

## 2022-12-09 ENCOUNTER — TELEPHONE (OUTPATIENT)
Dept: ORTHOPEDIC SURGERY | Age: 47
End: 2022-12-09

## 2022-12-29 ENCOUNTER — TELEPHONE (OUTPATIENT)
Dept: ORTHOPEDIC SURGERY | Age: 47
End: 2022-12-29

## 2022-12-29 DIAGNOSIS — M51.36 DDD (DEGENERATIVE DISC DISEASE), LUMBAR: ICD-10-CM

## 2022-12-29 DIAGNOSIS — S39.012A STRAIN OF LUMBAR REGION, INITIAL ENCOUNTER: ICD-10-CM

## 2022-12-29 NOTE — TELEPHONE ENCOUNTER
Pt called in to request a refill on meds ibuprofen (ADVIL;MOTRIN) 800 MG tablet and states was on a muscle relaxer which helped but states had talked to Arun about maybe going on something stronger so wanted to know what options he had available       Please send all refilled to     [facility] Shanell Noriega, [address] P# [phone]    Please call pt with any questions @ [phone]

## 2022-12-29 NOTE — TELEPHONE ENCOUNTER
Pt called in was scheduled for an MRI on 11/22/22 from [facility] but his insurance denied the MRI due to cost, and pt is asking to see if Ruby Kenyon is able to work with his insurance to the decline overridden so he can the MRI done    Please call pt @ [phone] with any questions--also putting in a med refill reqst

## 2023-01-03 RX ORDER — METHOCARBAMOL 500 MG/1
500 TABLET, FILM COATED ORAL 3 TIMES DAILY
Qty: 30 TABLET | Refills: 1 | Status: SHIPPED | OUTPATIENT
Start: 2023-01-03 | End: 2023-01-13

## 2023-01-03 RX ORDER — IBUPROFEN 800 MG/1
800 TABLET ORAL 3 TIMES DAILY PRN
Qty: 90 TABLET | Refills: 0 | Status: SHIPPED | OUTPATIENT
Start: 2023-01-03

## 2023-01-03 NOTE — TELEPHONE ENCOUNTER
Patient voiced understanding and states that he would return call with a new pt facility to send referal

## 2023-01-03 NOTE — TELEPHONE ENCOUNTER
Unfortunately patient is going to have to undergo further PT to justify completion of conservative management before approval of MRI likely.

## 2023-01-08 DIAGNOSIS — S39.012A STRAIN OF LUMBAR REGION, INITIAL ENCOUNTER: ICD-10-CM

## 2023-01-08 DIAGNOSIS — M51.36 DDD (DEGENERATIVE DISC DISEASE), LUMBAR: ICD-10-CM

## 2023-01-09 RX ORDER — MELOXICAM 15 MG/1
TABLET ORAL
Qty: 30 TABLET | Refills: 0 | Status: SHIPPED | OUTPATIENT
Start: 2023-01-09

## 2023-01-09 NOTE — TELEPHONE ENCOUNTER
Pharmacy requested mobic refill for lumbar strain & DDD.   15mg 30 tabs with 0 refills  LOV: 11/17/22  LRF: 10/11/22

## 2023-01-11 ENCOUNTER — TELEPHONE (OUTPATIENT)
Dept: ORTHOPEDIC SURGERY | Age: 48
End: 2023-01-11

## 2023-01-11 DIAGNOSIS — M51.36 DDD (DEGENERATIVE DISC DISEASE), LUMBAR: ICD-10-CM

## 2023-01-11 DIAGNOSIS — S39.012A STRAIN OF LUMBAR REGION, INITIAL ENCOUNTER: Primary | ICD-10-CM

## 2023-01-11 NOTE — TELEPHONE ENCOUNTER
Rehan Orozco, pt has not been seen since 11/17/22.  Ok to send new referral for PT or would you like to see pt first?

## 2023-01-11 NOTE — TELEPHONE ENCOUNTER
Patient is requesting his PT referral be faxed to [facility], Fax # [phone]. Thank you.

## 2023-02-14 ENCOUNTER — HOSPITAL ENCOUNTER (OUTPATIENT)
Dept: PHYSICAL THERAPY | Facility: CLINIC | Age: 48
Setting detail: THERAPIES SERIES
Discharge: HOME | End: 2023-02-14
Payer: COMMERCIAL

## 2023-02-14 PROCEDURE — 97110 THERAPEUTIC EXERCISES: CPT

## 2023-02-14 PROCEDURE — 97161 PT EVAL LOW COMPLEX 20 MIN: CPT

## 2023-02-14 NOTE — CONSULTS
[x] [facility]  Outpatient Rehabilitation &  Therapy  [facility]   [place]: [phone]  F: [phone]      Physical Therapy Spine Evaluation    Date:  2023  Patient: Nora Simmons  : 1975  MRN: [id number]  Physician: ABRIL Stevens   Insurance: [employer] [place] Medicaid (AUTH AFTER EVAL)  Medical Diagnosis: S39.012A (ICD-10-CM) - Strain of lumbar region, initial encounter; M51.36 (ICD-10-CM) - DDD (degenerative disc disease), lumbar  Rehab Codes: M62.81 (Muscle Weakness), M62.9 (Disorder of Muscle), M79.1 (Myalgia), Z73.6 (Limitation of ADLs)    Onset Date: 10/2021  Next 's appt. : 23      Subjective:   CC/HPI: Patient is a 52 y.o male who reports to therapy with complaints of LBP. Patient reports the pain started around . Patient states the pain is constant, sharp, and throbbing. Patient attended PT here for same issue from 10-28-22 to 22 for a total of three visits. PMHx:   [] Unremarkable               [x] Refer to full medical chart  In EPIC     Tests: [x] X-Ray:     EXAMINATION:   THREE XRAY VIEWS OF THE LUMBAR SPINE       10/5/2022 10:46 am       COMPARISON:   None. HISTORY:   ORDERING SYSTEM PROVIDED HISTORY: pain, old injury   TECHNOLOGIST PROVIDED HISTORY:   pain, old injury   Reason for Exam: Acute lower back pain, more so on the Right side. Hx of   several old sports injuries, no acute trauma. No surgery. FINDINGS:   No fractures or subluxations are seen. There is disc space narrowing with   eburnation of the vertebral endplates at the  M9-I2 level. There is mild   sclerosis of the facet joints in the  lower lumbar spine. Questionable   partial pars defect at L5. The posterior elements are otherwise intact. The   paraspinal soft tissues are unremarkable except for mild atherosclerotic   changes.          Comorbidities:   [] Obesity [] Dialysis  [x] N/A   [] Asthma/COPD [] Dementia [] Other:   [] Stroke [] Sleep apnea [] Other: [] Vascular disease [] Rheumatic disease [] Other:       Medications: [x] Refer to full medical record [] None [] Other:  Allergies:      [x] Refer to full medical record [] None [] Other:    ADL/IADL [x] Previously independent with all [] Currently independent with all Who currently assists the patient with task     [] Previously independent with all except: [x] Currently independent with all except:     Bathing  [] Assist [] Assist     Dress/grooming [] Assist [x] Assist  Girlfriend assists at times   Transfer/mobility [] Assist [] Assist     Feeding [] Assist [] Assist     Toileting [] Assist [] Assist     Driving [] Assist [] Assist     Housekeeping [] Assist [] Assist     Grocery shop/meal prep [] Assist [] Assist        Gait Prior level of function Current level of function    [x] Independent  [] Assist [x] Independent  [] Assist   Device: [] Independent [] Independent    [] Straight Cane [] Quad cane [] Straight Cane [] Quad cane    [] Standard walker [] Rolling walker   [] 4 wheeled walker [] Standard walker [] Rolling walker   [] 4 wheeled walker    [] Wheelchair [] Wheelchair       Function:  Hand Dominance  [x] Right  [] Left  Marital Status Single - Lives alone   Home type Apartment complex   Stairs from outside 6    Employment Unemployed - was on short term disability. Recreational Activities Elliptical   Walking  Upper body exercises         Pain present?  Yes   Location LBP, groin area, testicles, buttocks   Pain Rating currently 6/10   Pain at worse 10/10   Pain at best 6/10   Description of pain Sharp, aching pain, throbbing, warm   Altered Sensation Intact   What makes it worse All movement   What makes it better Bath, stretching   Symptom progression Progressing from the initial start of pain    Sleep Waking him up, unable to find a comfortable position   Bowel/bladder dysfunctions None reported per patient       Objective:   STRENGTH    Left Right   Hip Flexion 4 4    Hip Abduction  4- 4- Hip Adduction 4- 4-   Knee    Flexion 4+ 4+   Knee Extension 4+ 4+   Ankle PF 5 5   Ankle DF 5 5                                    Lumbar ROM Left Right   Flexion Edwards 75%     Extension Edwards 75%     Rotation  Edwards 25% Edwards 25%   Sidebend  Edwards 50% Edwards 50%       TESTS (+/-) LEFT RIGHT Not Tested   SLR Supine - - []   Hamstring (SLR) + + []   Slump/Dural - - []       OBSERVATION No Deficit Deficit Not Tested Comments   Posture       Forward Head [] [x] []    Rounded Shoulders [] [x] []    Kyphosis [] [x] []    Lordosis [] [x] [] Increased lumbar lordosis. Slumped sitting [] [x] []    Palpation [] [x] [] Anterior pelvic tilt   Sensation [x] [] []          Flexibility Normal Left tight Right tight   Hamstring [] [x] [x]   Hip flexor [] [x] [x]   Piriformis [] [] []   Gastroc/Soleus [] [x] [x]             FUNCTION Normal Difficult Unable   Sitting [] [x] []   Standing [] [x] []   Ambulation [] [x] []   Stairs [] [x] []   Lift/Carry [] [x] []   Bending [] [x] []   Sit to Stand [] [x] []       Functional Test: Modified Oswestry Score: 52% functionally impaired         Assessment:    Patient is a 52 y.o male who reports to therapy with a long history of LBP. Patient demonstrates lower crossed syndrome and symptoms. Patient has tightness in his hip flexors, hamstrings, and gastrcs bilaterally. Patient was unable to complete supine bridge with full ROM and he was unable to hold PPT due to increased pain. Patient had significant limitations in ROM due to significant pain. Patient was given education with regards of MRI and conservative treatment options. Patient would benefit from skilled physical therapy services in order to: Improve lower crossed syndrome symptoms in patient to improve pain, flexibility, ROM, and strength. Problems:    [x] ? Pain  [x] ? ROM  [x] ? Strength  [x] ?  Function        Goals  MET NOT MET ON-  GOING  Details   Date Addressed:        STG: To be met in 10 treatments           1. ? Pain: Decrease pain levels in low back to 4/10 with ADLs []  []  []      2. ? ROM: Increase flexibility and AROM limitations throughout in low backto equal bilat to reduce difficulty with ADLs []  []  []      3. ? Strength: Increase MMT to 5/5 throughout LE and trunk to ease functional limitations and mobility  []  []  []     4. Independent with Home Exercise Programs []  []  []                   Date Addressed:        LTG: To be met in 20 treatments       1. Improve score on assessment tool Modified Oswestry from 52% impairment to less than 45% impairment  []  []  []     2. Reduce pain levels to 3/10 or less with ADLs []  []  []     3. Patient is to demonstrate ability to reach full ROM with supine bridge. []  []  []                 Patient goals: \"Just required for MRI\"; limit pain    Rehab Potential:  [x] Good  [] Fair  [] Poor   Suggested Professional Referral:  [x] No  [] Yes:  Barriers to Goal Achievement[de-identified]  [x] No  [] Yes:  Domestic Concerns:  [x] No  [] Yes:    Pt. Education:  [x] Plans/Goals, Risks/Benefits discussed  [x] Home exercise program  Method of Education: [x] Verbal  [x] Demo  [x] Written  Access Code: [email or web address]  URL: [email or web address]  Date: 02/14/2023  Prepared by: Maricarmen Fernandez    Exercises  Modified Brenda Guzman Stretch - 1 x daily - 7 x weekly - 3 sets - 2-3 min hold  Hip Flexor Stretch on Step - 1 x daily - 7 x weekly - 3 sets - 30 (sec) hold  Standing Hip Flexor Stretch - 1 x daily - 7 x weekly - 3 sets - 30 (sec) hold  Standing Gastroc Stretch on Step - 1 x daily - 7 x weekly - 3 sets - 30 (sec) hold  Standing Hamstring Stretch on Chair - 1 x daily - 7 x weekly - 3 sets - 30 (sec) hold    Patient Education  Office Posture  Comprehension of Education:  [x] Verbalizes understanding. [x] Demonstrates understanding. [x] Needs Review. [] Demonstrates/verbalizes understanding of HEP/Ed previously given.     Treatment Plan:  [x] Therapeutic Exercise   03970  [] Iontophoresis: 4 mg/mL Dexamethasone Sodium Phosphate  mAmin  70128   [x] Manual Therapy  49255 [] Vasopneumatic cold with compression  11567    [] Gait Training   22084 [] Ultrasound   55648   [x] Neuromuscular Re-education  44716 [] Electrical Stimulation Unattended  67199   [x]  Therapeutic Activity  01173 [] Electrical Stimulation Attended  64333   [x] Instruction in HEP  [] Lumbar/Cervical Traction  [id number]   [] Aquatic Therapy   50391 [] Cold/hotpack    [] Massage   81642      [] Dry Needling, 1 or 2 muscles  01400   [] Biofeedback, first 15 minutes   87247  [] Biofeedback, additional 15 minutes   12301 [] Dry Needling, 3 or more muscles  79101     []  Medication allergies reviewed for use of    Dexamethasone Sodium Phosphate 4mg/ml     with iontophoresis treatments. Pt is not allergic.     Frequency:  1-2 x/week for 20 visits      Todays Treatment:  Exercise    Access Code: [email or web address]    LBP- lower crossed syndrome Reps/ Time Weight/ Level Comments         Treadmill Walk            Hip Flexor Step Stretch   HEP   Calf Stretch   HEP   HS Stretch   HEP   Hip ER Stretch            Sidelying Hip Abd       Clamshells      Prone Hip Extension      Balance board                                                                     Other:    Specific Instructions for next treatment: Assess piriformis tightness     Evaluation Complexity:  History (Personal factors, comorbidities) [x] 0 [] 1-2 [] 3+   Exam (limitations, restrictions) [x] 1-2 [] 3 [] 4+   Clinical presentation (progression) [x] Stable [] Evolving  [] Unstable   Decision Making [x] Low [] Moderate [] High    [x] Low Complexity [] Moderate Complexity [] High Complexity       Treatment Charges: Mins Units   [x] Evaluation       [x]  Low       []  Moderate       []  High 45 1   []  Modalities     [x]  Ther Exercise 10 1   []  Manual Therapy     []  Ther Activities     []  Aquatics     []  Vasocompression     []  Other       TOTAL TREATMENT TIME: 55    Time in: 0756      Time out: 2463    Electronically signed by: Kanchan Jaramillo, Student Physical Therapist  This document has been reviewed and approved by Meg N Patrick Carlson PT      Physician Signature:________________________________Date:__________________  By signing above or cosigning this note, I have reviewed this plan of care and certify a need for medically necessary rehabilitation services.      *PLEASE SIGN ABOVE AND FAX BACK ALL PAGES*

## 2023-02-16 ENCOUNTER — OFFICE VISIT (OUTPATIENT)
Dept: ORTHOPEDIC SURGERY | Age: 48
End: 2023-02-16
Payer: COMMERCIAL

## 2023-02-16 VITALS — WEIGHT: 239 LBS | RESPIRATION RATE: 14 BRPM | HEIGHT: 73 IN | BODY MASS INDEX: 31.68 KG/M2

## 2023-02-16 DIAGNOSIS — S39.012D STRAIN OF LUMBAR REGION, SUBSEQUENT ENCOUNTER: Primary | ICD-10-CM

## 2023-02-16 DIAGNOSIS — M51.36 DDD (DEGENERATIVE DISC DISEASE), LUMBAR: ICD-10-CM

## 2023-02-16 PROCEDURE — 1036F TOBACCO NON-USER: CPT | Performed by: PHYSICIAN ASSISTANT

## 2023-02-16 PROCEDURE — 99213 OFFICE O/P EST LOW 20 MIN: CPT | Performed by: PHYSICIAN ASSISTANT

## 2023-02-16 PROCEDURE — G8484 FLU IMMUNIZE NO ADMIN: HCPCS | Performed by: PHYSICIAN ASSISTANT

## 2023-02-16 PROCEDURE — G8427 DOCREV CUR MEDS BY ELIG CLIN: HCPCS | Performed by: PHYSICIAN ASSISTANT

## 2023-02-16 PROCEDURE — G8417 CALC BMI ABV UP PARAM F/U: HCPCS | Performed by: PHYSICIAN ASSISTANT

## 2023-02-16 RX ORDER — METHOCARBAMOL 500 MG/1
500 TABLET, FILM COATED ORAL 4 TIMES DAILY
Qty: 40 TABLET | Refills: 0 | Status: SHIPPED | OUTPATIENT
Start: 2023-02-16 | End: 2023-02-26

## 2023-02-16 RX ORDER — IBUPROFEN 800 MG/1
800 TABLET ORAL 3 TIMES DAILY PRN
Qty: 90 TABLET | Refills: 0 | Status: SHIPPED | OUTPATIENT
Start: 2023-02-16

## 2023-02-16 NOTE — PROGRESS NOTES
[address] [facility], 20 [facility] [address] [place], [address] [place] [address], [address] [facility]           Dept Phone: [phone]           Dept Fax:  [phone] [address] [facility]           Mariam Swift          Dept Phone: [phone]           Dept Fax:  [phone]      Chief Compliant:  Chief Complaint   Patient presents with    Pain     Low back pain        History of Present Illness: This is a 52 y.o. male who presents to the clinic today for evaluation of chronic low back pain right greater than left. Please see previous clinic notes for more detailed history. Patient reports unfortunately he has had minimal improvement in pain over the last 4 months he has completed 4 visits of PT as well as extensive home exercise in the interim with minimal improvement in fact he believes his pain is worsening. Localized pain most severely to the right lumbar area with radiation into the right buttock and occasional radiation into the right groin. Patient has been taking ibuprofen and methocarbamol which do seem to help with this pain mildly. He he does note occasional radiation down the right leg but no significant numbness and tingling, no bowel or bladder incontinence no fever chills. .     Past History:    Current Outpatient Medications:     ibuprofen (ADVIL;MOTRIN) 800 MG tablet, Take 1 tablet by mouth 3 times daily as needed for Pain, Disp: 90 tablet, Rfl: 0    methocarbamol (ROBAXIN) 500 MG tablet, Take 1 tablet by mouth 4 times daily for 10 days, Disp: 40 tablet, Rfl: 0    meloxicam (MOBIC) 15 MG tablet, TAKE ONE TABLET BY MOUTH DAILY, Disp: 30 tablet, Rfl: 0  No Known Allergies  Social History     Socioeconomic History    Marital status: Single     Spouse name: Not on file    Number of children: Not on file    Years of education: Not on file    Highest education level: Not on file Occupational History    Not on file   Tobacco Use    Smoking status: Former     Packs/day: 1.00     Years: 20.00     Pack years: 20.00     Types: Cigarettes    Smokeless tobacco: Never    Tobacco comments:     Currently vapes   Vaping Use    Vaping Use: Every day   Substance and Sexual Activity    Alcohol use: No    Drug use: Not Currently     Comment: last use 4 months ago     Sexual activity: Not Currently   Other Topics Concern    Not on file   Social History Narrative    Not on file     Social Determinants of Health     Financial Resource Strain: Not on file   Food Insecurity: Not on file   Transportation Needs: Not on file   Physical Activity: Not on file   Stress: Not on file   Social Connections: Not on file   Intimate Partner Violence: Not on file   Housing Stability: Not on file     Past Medical History:   Diagnosis Date    Substance abuse ([facility] [address]      No past surgical history on file. No family history on file. Review of Systems   Constitutional: Negative for fever, chills, sweats. Eyes: Negative for changes in vision, or pain. HENT: Negative for ear ache, epistaxis, or sore throat. Respiratory/Cardio: Negative for Chest pain, palpitations, SOB, or cough. Gastrointestinal: Negative for abdominal pain, N/V/D. Genitourinary: Negative for dysuria, frequency, urgency, or hematuria. Neurological: Negative for headache, numbness, or weakness. Integumentary: Negative for rash, itching, laceration, or abrasion. Musculoskeletal: Positive for Pain (Low back pain)       Physical Exam:  Constitutional: Patient is oriented to person, place, and time. Patient appears well-developed and well nourished. HENT: Negative otherwise noted  Head: Normocephalic and Atraumatic  Nose: Normal  Eyes: Conjunctivae and EOM are normal  Neck: Normal range of motion Neck supple. Respiratory/Cardio: Effort normal. No respiratory distress.   Musculoskeletal:    LUMBAR/SACRAL EXAMINATION:  Inspection: Local inspection shows no step-off or bruising. Lumbar alignment is normal.  Sagittal and Coronal balance is neutral.      Palpation:   Moderate right sided tenderness at the paraspinal. No evidence of tenderness at the midline. There is no step-off or paraspinal spasm. Range of Motion: Lumbar flexion, extension and rotation are mildly limited due to pain. Strength:   Strength testing is 5/5 in all muscle groups tested. Special Tests:   Straight leg raise and crossed SLR negative. Leg length and pelvis level.  0 out of 5 Deb's signs. Skin: There are no rashes, ulcerations or lesions. Reflexes: Reflexes are symmetrically 2+ at the patellar and ankle tendons. Clonus absent bilaterally at the feet. Gait & station: normal, patient ambulates without assistance  Additional Examinations:   RIGHT LOWER EXTREMITY: Inspection/examination of the right lower extremity does not show any tenderness, deformity or injury. Range of motion is unremarkable. There is no gross instability. There are no rashes, ulcerations or lesions. Strength and tone are normal.  LEFT LOWER EXTREMITY:  Inspection/examination of the left lower extremity does not show any tenderness, deformity or injury. Range of motion is unremarkable. There is no gross instability. There are no rashes, ulcerations or lesions. Strength and tone are normal.    Neurological: Patient is alert and oriented to person, place, and time. Normal strenght. No sensory deficit. Skin: Skin is warm and dry  Psychiatric: Behavior is normal. Thought content normal.  Nursing note and vitals reviewed. Labs and Imaging:     XR taken today:  No results found. No new x-rays taken today however those from 10/5/2022 available for independent review  AP and lateral views of the lumbar spine demonstrate mild degenerative changes greatest at L5-S1. No evidence of spondylitic instability, acute fracture or other acute osseous abnormality.      No orders of the defined types were placed in this encounter. Assessment and Plan:  1. Strain of lumbar region, initial encounter    2. DDD (degenerative disc disease), lumbar          PLAN:  Mago Fuentes is a 52 y.o. old male who presents for follow-up of chronic low back pain, right greater than left. Patient unfortunately continues to be significantly painful despite 4 visits of physical therapy, prescription NSAIDs and muscle relaxants. Recommend continuation of physical therapy however should patient pain worsen or he fail to improve with continued PT would recommend reordering MRI for further diagnostic evaluation. We will see patient back in 4 weeks however he may call return sooner for any questions or concerns he is instructed to contact our office should his pain worsen with PT I would be happy to reorder MRI. Please note that this chart was generated using voice recognition Dragon dictation software. Although every effort was made to ensure the accuracy of this automated transcription, some errors in transcription may have occurred.

## 2023-02-21 ENCOUNTER — HOSPITAL ENCOUNTER (OUTPATIENT)
Dept: PHYSICAL THERAPY | Facility: CLINIC | Age: 48
Setting detail: THERAPIES SERIES
End: 2023-02-21
Payer: COMMERCIAL

## 2023-02-23 ENCOUNTER — HOSPITAL ENCOUNTER (OUTPATIENT)
Dept: PHYSICAL THERAPY | Facility: CLINIC | Age: 48
Setting detail: THERAPIES SERIES
Discharge: HOME | End: 2023-02-23
Payer: COMMERCIAL

## 2023-02-23 PROCEDURE — 97140 MANUAL THERAPY 1/> REGIONS: CPT

## 2023-02-23 PROCEDURE — 97110 THERAPEUTIC EXERCISES: CPT

## 2023-02-23 NOTE — FLOWSHEET NOTE
[x] [facility]  Outpatient Rehabilitation &  Therapy  [facility]   [place]: [phone]  F: [phone]      Physical Therapy Daily Treatment Note    Date:  2023  Patient Name:  Dylan Hernandes    :  1975  MRN: [id number]  Physician: ABRIL Leigh                                 Insurance: [facility] 23-23  Medical Diagnosis: E15.463N (ICD-10-CM) - Strain of lumbar region, initial encounter; M51.36 (ICD-10-CM) - DDD (degenerative disc disease), lumbar  Rehab Codes: M62.81 (Muscle Weakness), M62.9 (Disorder of Muscle), M79.1 (Myalgia), Z73.6 (Limitation of ADLs)    Onset Date: 10/2021              Next 's appt. :   Visit# / total visits:     Cancels/No Shows: 0/0    Subjective:    Pain:  [x] Yes  [] No Location: low back Pain Rating: (0-10 scale) 10/10  Pain altered Tx:  [x] No  [] Yes  Action:  Comments: Patient arrives 10 minutes late due to traffic, able to accommodate. Patient states continued pain and states he has 10/10 pain with bending forward and with any movements. Objective:   Todays Treatment:  Exercise     Access Code: [email or web address]     LBP- lower crossed syndrome Reps/ Time Weight/ Level Comments             Treadmill Walk  10'                 Hip Flexor Step Stretch  1' bilateral HEP   SB Calf Stretch  3x30\"   HEP   HS Stool Stretch  3x30\"  bilateral HEP   Hip ER Stretch  3x30\"  bilateral HEP         Posterior pelvic tilts  next       Sidelying Hip Abd   next       Clamshells  next       Prone Hip Extension  next                                        Other:   Manual: MFR via bilateral piriformis   Self mobilization to piriformis via tennis ball-HEP     Specific Instructions for next treatment: Assess hip tightness, hip strength ex's       Treatment Charges: Mins Units Total units approved    []  Modalities      [x]  Ther Exercise    [x]  Manual Therapy 10 1 1/20   []  Ther Activities      []  Aquatics      [] Vasocompression      []  Other      Total Treatment time 30 2        Assessment: [x] Progressing toward goals. Initiated with warm-up on treadmill to focus on hip extension and upright posture. Focused on manual and stretches this date due to increased muscle tension and pain noted upon arrival. Patient notes increased irritation with hip flexion stretching due to increased muscle tension. Assessed piriformis with bilateral tightness R>L. Added hip ER stretch to HEP and demonstrated self MFR via tennis ball to piriformis. Discussed proper sitting and standing posture to avoid placing hip musculature in a shortened position. Patient notes improved symptoms at end of treatment. Will address manual next visit and add hip strengthening. [] No change. [] Other:  [x] Patient would continue to benefit from skilled physical therapy services in order to: Improve lower crossed syndrome symptoms in patient to improve pain, flexibility, ROM, and strength. STG/LTG  Goals  MET NOT MET ON-  GOING  Details   Date Addressed:            STG: To be met in 10 treatments            1. ? Pain: Decrease pain levels in low back to 4/10 with ADLs []  []  []      2. ? ROM: Increase flexibility and AROM limitations throughout in low backto equal bilat to reduce difficulty with ADLs []  []  []      3. ? Strength: Increase MMT to 5/5 throughout LE and trunk to ease functional limitations and mobility  []  []  []      4. Independent with Home Exercise Programs []  []  []                              Date Addressed:            LTG: To be met in 20 treatments           1. Improve score on assessment tool Modified Oswestry from 52% impairment to less than 45% impairment  []  []  []      2. Reduce pain levels to 3/10 or less with ADLs []  []  []      3.  Patient is to demonstrate ability to reach full ROM with supine bridge. []  []  []                        Patient goals: \"Just required for MRI\"; limit pain     Rehab Potential:  [x] Good  [] Fair  [] Poor    Suggested Professional Referral:  [x] No  [] Yes:  Barriers to Goal Achievement[de-identified]  [x] No  [] Yes:  Domestic Concerns:  [x] No  [] Yes:       Pt. Education:  [x] Yes  [] No  [x] Reviewed Prior HEP/Ed  Method of Education: [x] Verbal  [x] Demo  [x] Written    Access Code: [email or web address]  URL: [email or web address]  Date: 02/23/2023  Prepared by: Aristides Peters    Exercises  Modified Romayne Medin Stretch - 1 x daily - 7 x weekly - 3 sets - 2-3 min hold  Hip Flexor Stretch on Step - 1 x daily - 7 x weekly - 3 sets - 30 (sec) hold  Standing Hip Flexor Stretch - 1 x daily - 7 x weekly - 3 sets - 30 (sec) hold  Standing Gastroc Stretch on Step - 1 x daily - 7 x weekly - 3 sets - 30 (sec) hold  Standing Hamstring Stretch on Chair - 1 x daily - 7 x weekly - 3 sets - 30 (sec) hold  Supine Piriformis Stretch with Foot on Ground - 1 x daily - 7 x weekly - 3 sets - 30 second hold  Piriformis Mobilization with Small Ball - 1 x daily - 7 x weekly - 3 sets - 10 reps    Patient Education  Office Posture    Comprehension of Education:  [x] Verbalizes understanding. [] Demonstrates understanding. [x] Needs review. [] Demonstrates/verbalizes HEP/Ed previously given. Plan: [x] Continue current frequency toward long and short term goals.           Time In: 8:10am            Time Out: 8:50am    Electronically signed by:  Aristides Peters PTA

## 2023-02-24 ENCOUNTER — TELEPHONE (OUTPATIENT)
Dept: ORTHOPEDIC SURGERY | Age: 48
End: 2023-02-24

## 2023-02-24 DIAGNOSIS — S39.012D STRAIN OF LUMBAR REGION, SUBSEQUENT ENCOUNTER: Primary | ICD-10-CM

## 2023-02-24 DIAGNOSIS — M51.36 DDD (DEGENERATIVE DISC DISEASE), LUMBAR: ICD-10-CM

## 2023-02-24 NOTE — TELEPHONE ENCOUNTER
Pt underwent 6 PT appts and still has a lot of pain. Pt is taking medications as prescribed. Please call pt with next step.

## 2023-02-24 NOTE — TELEPHONE ENCOUNTER
Sheridan Drake, looks like pt has completed 1 PT visit since last seen in office where you dictated that you wanted him to f/u in 4 weeks but would order MRI if pain is worsening with PT. Would you like to or MRI order wait until he is seen and re-evaluated at next OV?

## 2023-02-28 NOTE — TELEPHONE ENCOUNTER
LVM with pt advising that order for MRI has been placed. He was given the number to call and schedule the study.

## 2023-03-02 ENCOUNTER — HOSPITAL ENCOUNTER (OUTPATIENT)
Dept: PHYSICAL THERAPY | Facility: CLINIC | Age: 48
Setting detail: THERAPIES SERIES
Discharge: HOME | End: 2023-03-02
Payer: COMMERCIAL

## 2023-03-02 PROCEDURE — 97140 MANUAL THERAPY 1/> REGIONS: CPT

## 2023-03-02 PROCEDURE — 97110 THERAPEUTIC EXERCISES: CPT

## 2023-03-02 NOTE — FLOWSHEET NOTE
[x] [facility]  Outpatient Rehabilitation &  Therapy  [facility]   [place]: [phone]  F: [phone]      Physical Therapy Daily Treatment Note    Date:  3/2/2023  Patient Name:  Scott Nunes    :  1975  MRN: [id number]  Physician: ABRIL Shoemaker                                 [facility]  Medical Diagnosis: U68.517W (ICD-10-CM) - Strain of lumbar region, initial encounter; M51.36 (ICD-10-CM) - DDD (degenerative disc disease), lumbar  Rehab Codes: M62.81 (Muscle Weakness), M62.9 (Disorder of Muscle), M79.1 (Myalgia), Z73.6 (Limitation of ADLs)    Onset Date: 10/2021              Next 's appt. :   Visit# / total visits: 3/20    Cancels/No Shows: 0/0    Subjective:    Pain:  [x] Yes  [] No Location: low back Pain Rating: (0-10 scale) 9-10/10  Pain altered Tx:  [x] No  [] Yes  Action:  Comments: Patient arrives stating he cannot put his socks on and continues to have pain with trunk flexion, rotation and extension. Patient states the supine piriformis stretch is helpful and is very compliant everyday. Would like to review stretches and learn some stretches for when he is sitting in his chair during classes. MRI is scheduled for Tuesday 3/7/23. Objective: Todays Treatment:  Precautions: No HS stretching due to patient have pain with trunk flexion and does not feel a stretch.   Exercise     Access Code: [email or web address]     LBP- lower crossed syndrome Reps/ Time Weight/ Level Comments             Treadmill Walk  10'  2.0mph               SB Calf Stretch 3x30\"   HEP         Seated hip ER stretch HEP     Hip ER Stretch 3x30\" Bilateral HEP   Supine hip flexor stretch  1' Bilateral  HEP   Posterior pelvic tilts 10x10\"       Bridges x10 difficult    Sidelying Hip Abd  next       Clamshells 2x10       Prone Hip Extension Unable  painful                                      Other:   Manual: MFR via bilateral piriformis L>R  Self mobilization to piriformis via tennis ball-HEP     Specific Instructions for next treatment: Assess piriformis/hip flexor tightness, hip strength ex's, lower cross ex's        Treatment Charges: Mins Units Total units approved    []  Modalities      [x]  Ther Exercise 29 2 3/60   [x]  Manual Therapy 10 1 2/20   []  Ther Activities      []  Aquatics      []  Vasocompression      []  Other      Total Treatment time 39 3        Assessment: [x] Progressing toward goals. Reviewed HEP and demonstrated how to use a lacrosse ball on piriformis due to patient misunderstanding form last visit. Patient plans on purchasing a lacrosse ball in the future. Demonstrated how to stretch piriformis in a chair during class and how to stretch his hip flexor on his bed using the Modified Kit stretch since his bed is low. Educated patient that his stretches need to be completed 2-3 times a day instead of once. Poor tolerance to prone hip extension due to low back pain associated and difficulty with bridges. Patient demonstrates an anterior pelvic tilt at rest, therefore, added posterior pelvic tilting to activate core musculature. Will continue to address manual and progress flexibility and glute strengthening next visit. [] No change. [] Other:  [x] Patient would continue to benefit from skilled physical therapy services in order to: Improve lower crossed syndrome symptoms in patient to improve pain, flexibility, ROM, and strength. STG/LTG  Goals  MET NOT MET ON-  GOING  Details   Date Addressed:            STG: To be met in 10 treatments            1. ? Pain: Decrease pain levels in low back to 4/10 with ADLs []  []  []      2. ? ROM: Increase flexibility and AROM limitations throughout in low backto equal bilat to reduce difficulty with ADLs []  []  []      3. ? Strength: Increase MMT to 5/5 throughout LE and trunk to ease functional limitations and mobility  []  []  []      4.  Independent with Home Exercise Programs []  [] []                              Date Addressed:            LTG: To be met in 20 treatments           1. Improve score on assessment tool Modified Oswestry from 52% impairment to less than 45% impairment  []  []  []      2. Reduce pain levels to 3/10 or less with ADLs []  []  []      3. Patient is to demonstrate ability to reach full ROM with supine bridge. []  []  []                        Patient goals: \"Just required for MRI\"; limit pain     Rehab Potential:  [x] Good  [] Fair  [] Poor    Suggested Professional Referral:  [x] No  [] Yes:  Barriers to Goal Achievement[de-identified]  [x] No  [] Yes:  Domestic Concerns:  [x] No  [] Yes:       Pt. Education:  [x] Yes  [] No  [x] Reviewed Prior HEP/Ed  Method of Education: [x] Verbal  [x] Demo  [x] Written    Access Code: [email or web address]  URL: [email or web address]  Date: 02/23/2023  Prepared by: Lisa Montano    Exercises  Modified Christine Dipika Stretch - 1 x daily - 7 x weekly - 3 sets - 2-3 min hold  Hip Flexor Stretch on Step - 1 x daily - 7 x weekly - 3 sets - 30 (sec) hold  Standing Hip Flexor Stretch - 1 x daily - 7 x weekly - 3 sets - 30 (sec) hold  Standing Gastroc Stretch on Step - 1 x daily - 7 x weekly - 3 sets - 30 (sec) hold  Standing Hamstring Stretch on Chair - 1 x daily - 7 x weekly - 3 sets - 30 (sec) hold  Supine Piriformis Stretch with Foot on Ground - 1 x daily - 7 x weekly - 3 sets - 30 second hold  Piriformis Mobilization with Small Ball - 1 x daily - 7 x weekly - 3 sets - 10 reps    Patient Education  Office Posture    Comprehension of Education:  [x] Verbalizes understanding. [] Demonstrates understanding. [x] Needs review. [] Demonstrates/verbalizes HEP/Ed previously given. Plan: [x] Continue current frequency toward long and short term goals.           Time In: 8:05am            Time Out: 8:54am    Electronically signed by:  Lisa Montano PTA

## 2023-03-07 ENCOUNTER — HOSPITAL ENCOUNTER (OUTPATIENT)
Dept: MRI IMAGING | Age: 48
Discharge: HOME | End: 2023-03-09
Payer: COMMERCIAL

## 2023-03-07 DIAGNOSIS — M51.36 DDD (DEGENERATIVE DISC DISEASE), LUMBAR: ICD-10-CM

## 2023-03-07 DIAGNOSIS — S39.012D STRAIN OF LUMBAR REGION, SUBSEQUENT ENCOUNTER: ICD-10-CM

## 2023-03-07 PROCEDURE — 72148 MRI LUMBAR SPINE W/O DYE: CPT

## 2023-03-09 ENCOUNTER — HOSPITAL ENCOUNTER (OUTPATIENT)
Dept: PHYSICAL THERAPY | Facility: CLINIC | Age: 48
Setting detail: THERAPIES SERIES
Discharge: HOME | End: 2023-03-09
Payer: COMMERCIAL

## 2023-03-09 PROCEDURE — 97110 THERAPEUTIC EXERCISES: CPT

## 2023-03-09 PROCEDURE — 97140 MANUAL THERAPY 1/> REGIONS: CPT

## 2023-03-09 NOTE — FLOWSHEET NOTE
[x] [facility]  Outpatient Rehabilitation &  Therapy  [facility]   [place]: [phone]  F: [phone]      Physical Therapy Daily Treatment Note    Date:  3/9/2023  Patient Name:  Ania Live    :  1975  MRN: [id number]  Physician: ABRIL Mcclain                                 Insurance: [facility] 23-23  Medical Diagnosis: K37.764Z (ICD-10-CM) - Strain of lumbar region, initial encounter; M51.36 (ICD-10-CM) - DDD (degenerative disc disease), lumbar  Rehab Codes: M62.81 (Muscle Weakness), M62.9 (Disorder of Muscle), M79.1 (Myalgia), Z73.6 (Limitation of ADLs)    Onset Date: 10/2021              Next 's appt. :     Visit# / total visits:     Cancels/No Shows: 0/0    Subjective:    Pain:  [x] Yes  [] No Location: low back Pain Rating: (0-10 scale) 9-10/10  Pain altered Tx:  [x] No  [] Yes  Action:  Comments: Patient arrived noting no significant change in symptoms. Objective: Todays Treatment:  Precautions: No HS stretching due to patient have pain with trunk flexion and does not feel a stretch.   Exercise     Access Code: [email or web address]     LBP- lower crossed syndrome Reps/ Time Weight/ Level Comments             Treadmill Walk  10'  2.0mph               SB Calf Stretch 3x30\"   HEP         Seated hip ER stretch HEP     Hip ER Stretch 3x30\" Bilateral HEP   Supine hip flexor stretch  1' Bilateral  HEP   Posterior pelvic tilts 10x10\"       Bridges x10 difficult    Sidelying Hip Abd  next       Clamshells 2x10       Prone Hip Extension Unable  painful                4 way hip x10 Green                  Other:   Manual: MFR via bilateral piriformis L>R  Self mobilization to piriformis via tennis ball-HEP     Specific Instructions for next treatment: Assess piriformis/hip flexor tightness, hip strength ex's, lower cross ex's        Treatment Charges: Mins Units Total units approved    []  Modalities      [x]  Ther Exercise 30 2 5/60   [x]  Manual Therapy 8 1 3/20   []  Ther Activities      []  Aquatics      []  Vasocompression      []  Other      Total Treatment time 38 3        Assessment: [x] Progressing toward goals. Able to progress strength program this date with minor increase in soreness with hip extension this date but able to complete. Continued with manual and stretching this date. Patient will call physician today to set up an appointment to review MRI.     [] No change. [] Other:  [x] Patient would continue to benefit from skilled physical therapy services in order to: Improve lower crossed syndrome symptoms in patient to improve pain, flexibility, ROM, and strength. Goals  MET NOT MET ON-  GOING  Details   Date Addressed:            STG: To be met in 10 treatments            1. ? Pain: Decrease pain levels in low back to 4/10 with ADLs []  []  []      2. ? ROM: Increase flexibility and AROM limitations throughout in low backto equal bilat to reduce difficulty with ADLs []  []  []      3. ? Strength: Increase MMT to 5/5 throughout LE and trunk to ease functional limitations and mobility  []  []  []      4. Independent with Home Exercise Programs []  []  []                              Date Addressed:            LTG: To be met in 20 treatments           1. Improve score on assessment tool Modified Oswestry from 52% impairment to less than 45% impairment  []  []  []      2. Reduce pain levels to 3/10 or less with ADLs []  []  []      3. Patient is to demonstrate ability to reach full ROM with supine bridge. []  []  []                     Patient goals: \"Just required for MRI\"; limit pain     Pt. Education:  [x] Yes  [] No  [x] Reviewed Prior HEP/Ed  Method of Education: [x] Verbal  [x] Demo  [x] Written    Access Code: [email or web address]  URL: [email or web address]  Date: 02/23/2023  Prepared by: Gabriela Hagan    Exercises  Modified Ry Jin Stretch - 1 x daily - 7 x weekly - 3 sets - 2-3 min hold  Hip Flexor Stretch on Step - 1 x daily - 7 x weekly - 3 sets - 30 (sec) hold  Standing Hip Flexor Stretch - 1 x daily - 7 x weekly - 3 sets - 30 (sec) hold  Standing Gastroc Stretch on Step - 1 x daily - 7 x weekly - 3 sets - 30 (sec) hold  Standing Hamstring Stretch on Chair - 1 x daily - 7 x weekly - 3 sets - 30 (sec) hold  Supine Piriformis Stretch with Foot on Ground - 1 x daily - 7 x weekly - 3 sets - 30 second hold  Piriformis Mobilization with Small Ball - 1 x daily - 7 x weekly - 3 sets - 10 reps    Patient Education  Office Posture    Comprehension of Education:  [x] Verbalizes understanding. [] Demonstrates understanding. [x] Needs review. [] Demonstrates/verbalizes HEP/Ed previously given. Plan: [x] Continue current frequency toward long and short term goals.           Time In: 8:05am              Time Out: 8:45am    Electronically signed by:  Zen Yepez PTA

## 2023-03-10 ENCOUNTER — TELEPHONE (OUTPATIENT)
Dept: ORTHOPEDIC SURGERY | Age: 48
End: 2023-03-10

## 2023-03-10 NOTE — TELEPHONE ENCOUNTER
----- Message from [place], [place] sent at 3/9/2023  4:10 PM EST -----  Multilevel lumbar DDD without evidence of significant stenosis. Given increased signal of the disc at L5-S1 recommend further evaluation with CRP and sed rate to rule out infectious process although less likely in nature.

## 2023-03-13 ENCOUNTER — HOSPITAL ENCOUNTER (OUTPATIENT)
Age: 48
Setting detail: SPECIMEN
Discharge: HOME | End: 2023-03-13

## 2023-03-13 ENCOUNTER — OFFICE VISIT (OUTPATIENT)
Dept: ORTHOPEDIC SURGERY | Age: 48
End: 2023-03-13
Payer: COMMERCIAL

## 2023-03-13 VITALS — BODY MASS INDEX: 30.8 KG/M2 | RESPIRATION RATE: 14 BRPM | HEIGHT: 74 IN | WEIGHT: 240 LBS

## 2023-03-13 DIAGNOSIS — M51.36 DDD (DEGENERATIVE DISC DISEASE), LUMBAR: ICD-10-CM

## 2023-03-13 DIAGNOSIS — M51.36 DDD (DEGENERATIVE DISC DISEASE), LUMBAR: Primary | ICD-10-CM

## 2023-03-13 DIAGNOSIS — S39.012D STRAIN OF LUMBAR REGION, SUBSEQUENT ENCOUNTER: ICD-10-CM

## 2023-03-13 DIAGNOSIS — N50.812 PAIN IN BOTH TESTICLES: ICD-10-CM

## 2023-03-13 DIAGNOSIS — N50.811 PAIN IN BOTH TESTICLES: ICD-10-CM

## 2023-03-13 LAB
CRP SERPL HS-MCNC: <3 MG/L (ref 0–5)
ERYTHROCYTE [SEDIMENTATION RATE] IN BLOOD BY WESTERGREN METHOD: 5 MM/HR (ref 0–15)

## 2023-03-13 PROCEDURE — G8427 DOCREV CUR MEDS BY ELIG CLIN: HCPCS | Performed by: PHYSICIAN ASSISTANT

## 2023-03-13 PROCEDURE — G8484 FLU IMMUNIZE NO ADMIN: HCPCS | Performed by: PHYSICIAN ASSISTANT

## 2023-03-13 PROCEDURE — G8417 CALC BMI ABV UP PARAM F/U: HCPCS | Performed by: PHYSICIAN ASSISTANT

## 2023-03-13 PROCEDURE — 1036F TOBACCO NON-USER: CPT | Performed by: PHYSICIAN ASSISTANT

## 2023-03-13 PROCEDURE — 99213 OFFICE O/P EST LOW 20 MIN: CPT | Performed by: PHYSICIAN ASSISTANT

## 2023-03-13 RX ORDER — METHOCARBAMOL 500 MG/1
500 TABLET, FILM COATED ORAL 4 TIMES DAILY
COMMUNITY
End: 2023-03-13

## 2023-03-13 RX ORDER — IBUPROFEN 800 MG/1
800 TABLET ORAL 3 TIMES DAILY PRN
Qty: 90 TABLET | Refills: 0 | Status: SHIPPED | OUTPATIENT
Start: 2023-03-13

## 2023-03-13 RX ORDER — METHOCARBAMOL 500 MG/1
500 TABLET, FILM COATED ORAL 4 TIMES DAILY
Qty: 40 TABLET | Refills: 0 | Status: SHIPPED | OUTPATIENT
Start: 2023-03-13 | End: 2023-03-23

## 2023-03-13 NOTE — LETTER
March 13, 2023       Misha Montes [place] YOB: 1975   [facility] [address] [place] [address] Date of Visit:  3/13/2023       To Whom It May Concern: It is my medical opinion that Aston Day was seen in clinic today. If you have any questions or concerns, please don't hesitate to call.     Sincerely,          ABRIL Matias

## 2023-03-13 NOTE — PROGRESS NOTES
[facility], [address] [place] [address] Juan Francisco Fernández, [address] [place] [address]               Kerry Kearns 81.           Dept Phone: [phone]           Dept Fax:  [phone] [facility]           Mariam Swift          Dept Phone: [phone]           Dept Fax:  [phone]      Chief Compliant:  Chief Complaint   Patient presents with    Back Pain     Lumbar        History of Present Illness: This is a 52 y.o. male who presents to the clinic today for evaluation of chronic low back pain. Please see previous clinic notes for more detailed history. At last visit patient had failed physical therapy so MRI was ordered for further diagnostic evaluation. Patient completed MRI on 3/7/2023 returns today to discuss MRI results and future treatment options. Patient reports unfortunately he has had minimal improvement in pain continues to note severe pain over the bilateral lumbar area with occasional radiation into both buttocks. Patient reports that this pain remains unchanged despite continuation of physical therapy. Patient reports most concerning aspect is that he continues to have pain radiating into both of his testicles when sitting for an extended period of time. He is currently in class where he was set up to 7-8 hours a day and states that by the end the pain does seem to be worse in the testicles. He states this does seem to be affiliated with the onset of his back pain. Of note patient reports he is currently finding for an SSDI claim as he has been unable to work since October 2022 secondary to this pain. He reports he continues in physical therapy and is making strides in flexibility but continues to have quite a bit of pain in of the low back, bilateral buttock and intermittent radiation into the testicles.     He was discontinued off of meloxicam but continues taking Motrin and Robaxin which do seem to help with the pain. Past History:    Current Outpatient Medications:     ibuprofen (ADVIL;MOTRIN) 800 MG tablet, Take 1 tablet by mouth 3 times daily as needed for Pain, Disp: 90 tablet, Rfl: 0    meloxicam (MOBIC) 15 MG tablet, TAKE ONE TABLET BY MOUTH DAILY, Disp: 30 tablet, Rfl: 0  No Known Allergies  Social History     Socioeconomic History    Marital status: Single     Spouse name: Not on file    Number of children: Not on file    Years of education: Not on file    Highest education level: Not on file   Occupational History    Not on file   Tobacco Use    Smoking status: Former     Packs/day: 1.00     Years: 20.00     Pack years: 20.00     Types: Cigarettes    Smokeless tobacco: Never    Tobacco comments:     Currently vapes   Vaping Use    Vaping Use: Every day   Substance and Sexual Activity    Alcohol use: No    Drug use: Not Currently     Comment: last use 4 months ago     Sexual activity: Not Currently   Other Topics Concern    Not on file   Social History Narrative    Not on file     Social Determinants of Health     Financial Resource Strain: Not on file   Food Insecurity: Not on file   Transportation Needs: Not on file   Physical Activity: Not on file   Stress: Not on file   Social Connections: Not on file   Intimate Partner Violence: Not on file   Housing Stability: Not on file     Past Medical History:   Diagnosis Date    Substance abuse ([facility] [address]      No past surgical history on file. No family history on file. Review of Systems   Constitutional: Negative for fever, chills, sweats. Eyes: Negative for changes in vision, or pain. HENT: Negative for ear ache, epistaxis, or sore throat. Respiratory/Cardio: Negative for Chest pain, palpitations, SOB, or cough. Gastrointestinal: Negative for abdominal pain, N/V/D. Genitourinary: Negative for dysuria, frequency, urgency, or hematuria. Neurological: Negative for headache, numbness, or weakness. Integumentary: Negative for rash, itching, laceration, or abrasion. Musculoskeletal: Positive for Back Pain (Lumbar)       Physical Exam:  Constitutional: Patient is oriented to person, place, and time. Patient appears well-developed and well nourished. HENT: Negative otherwise noted  Head: Normocephalic and Atraumatic  Nose: Normal  Eyes: Conjunctivae and EOM are normal  Neck: Normal range of motion Neck supple. Respiratory/Cardio: Effort normal. No respiratory distress. Musculoskeletal:    LUMBAR/SACRAL EXAMINATION:  Inspection: Local inspection shows no step-off or bruising. Lumbar alignment is normal.  Sagittal and Coronal balance is neutral.      Palpation:   Mild tenderness bilaterally at the paraspinal. No evidence of tenderness at the midline. There is no step-off or paraspinal spasm. Range of Motion: Lumbar flexion, extension and rotation are mildly limited due to pain. Strength:   Strength testing is 5/5 in all muscle groups tested. Special Tests:   Straight leg raise and crossed SLR negative. Leg length and pelvis level.  0 out of 5 Deb's signs. Skin: There are no rashes, ulcerations or lesions. Reflexes: Reflexes are symmetrically 2+ at the patellar and ankle tendons. Clonus absent bilaterally at the feet. Gait & station: normal, patient ambulates without assistance  Additional Examinations:   RIGHT LOWER EXTREMITY: Inspection/examination of the right lower extremity does not show any tenderness, deformity or injury. Range of motion is unremarkable. There is no gross instability. There are no rashes, ulcerations or lesions. Strength and tone are normal.  LEFT LOWER EXTREMITY:  Inspection/examination of the left lower extremity does not show any tenderness, deformity or injury. Range of motion is unremarkable. There is no gross instability. There are no rashes, ulcerations or lesions.   Strength and tone are normal.    Neurological: Patient is alert and oriented to person, place, and time. Normal strenght. No sensory deficit. Skin: Skin is warm and dry  Psychiatric: Behavior is normal. Thought content normal.  Nursing note and vitals reviewed. Labs and Imaging:     XR taken today:  No results found. Narrative   EXAMINATION:   MRI OF THE LUMBAR SPINE WITHOUT CONTRAST, 3/7/2023 3:22 pm       TECHNIQUE:   Multiplanar multisequence MRI of the lumbar spine was performed without the   administration of intravenous contrast.       COMPARISON:   None. HISTORY:   ORDERING SYSTEM PROVIDED HISTORY: Strain of lumbar region, subsequent   encounter   TECHNOLOGIST PROVIDED HISTORY:   What is the sedation requirement?->None   Reason for Exam: Patient states chronic back and groin pain       Initial evaluation. FINDINGS:   BONES/ALIGNMENT: There is normal alignment of the spine. The vertebral body   heights are maintained. The bone marrow signal appears unremarkable. There   is degenerative disc disease with disc space narrowing and osteophytes at   L3-4, L4-5, and L5-S1. There are Modic type 1 degenerative endplate changes   at A0-B9. There is high signal disc at L5-S1. While the changes could be   degenerative in nature, the changes at L5-S1 also could represent discitis   and osteomyelitis in the appropriate clinical setting. Infection could be   excluded with C-reactive protein and ESR. SPINAL CORD: The conus terminates normally. SOFT TISSUES: No paraspinal mass identified. L1-L2: The thecal sac and neural foramina are intact. L2-L3: The thecal sac and neural foramina are intact. L3-L4: There is a disc bulge measuring 3 mm with small osteophytes. Disc   and/or osteophytes result in moderate narrowing of the neural foramina   bilaterally. The thecal sac is not stenotic. L4-L5: There is a disc bulge measuring 3-4 mm with small osteophytes. There   is mild facet and ligamentum flavum hypertrophy. The thecal sac is not   stenotic.  Disc and/or osteophytes result in moderate narrowing of the neural   foramina bilaterally. L5-S1: There is a disc bulge measuring 4-5 mm with small osteophytes. Disc   and/or osteophytes result in severe narrowing of the neural foramina   bilaterally. The thecal sac is not stenotic. The S1 nerve roots are intact. Impression   There is disc space narrowing and endplate changes at H1-E5. There is high   signal in the disc at L5-S1. While these could be degenerative in nature,   discitis and osteomyelitis cannot be entirely excluded. Infection could be   excluded with C-reactive protein and ESR. Postcontrast images could be   considered if clinically concerned for infection. Disc and osteophytes narrow the neural foramina at L3-4, L4-5, and L5-S1 as   discussed above. No stenosis of the thecal sac in the lumbar region. Orders Placed This Encounter   Procedures    Sedimentation Rate     Standing Status:   Future     Standing Expiration Date:   3/13/2024    C-reactive protein       Assessment and Plan:  1. DDD (degenerative disc disease), lumbar    2. Strain of lumbar region, subsequent encounter    3. Pain in both testicles          PLAN:  Martin Gonzalez is a 52 y.o. old male follow-up of chronic low back pain. Had lengthy discussion today on MRI results demonstrating multilevel lumbar DDD without evidence of significant central canal stenosis mild neural foraminal narrowing. MRI also found evidence of increased signal within L5-S1 disc likely degenerative in nature but could represent a subtle underlying infection i.e. osteomyelitis and recommend laboratory work-up to rule this out. Sed rate and CRP as ordered to rule out inflammatory/infectious process. Had lengthy discussion with patient on MRI results demonstrating multilevel lumbar DDD without evidence of significant stenosis.   Patient is educated should lab work being negative he would likely benefit from epidural steroid injections and referral to pain management is provided. Given patient's continued testicular pain although could be radicular in nature I would be happy to provide patient a referral to urologist for further evaluation if this continues and he did elect to proceed with this and referral was provided. Please note that this chart was generated using voice recognition Dragon dictation software. Although every effort was made to ensure the accuracy of this automated transcription, some errors in transcription may have occurred.

## 2023-03-16 ENCOUNTER — HOSPITAL ENCOUNTER (OUTPATIENT)
Dept: PHYSICAL THERAPY | Facility: CLINIC | Age: 48
Setting detail: THERAPIES SERIES
Discharge: HOME | End: 2023-03-16
Payer: COMMERCIAL

## 2023-03-16 NOTE — CARE COORDINATION
[] [facility] ([facility]) - [facility] &  Therapy  [address]    [phone]  F: [phone]   [] [address]  [facility]   [address]  P: [phone]  F: [phone]  [] [address] &  Therapy  [address]  P: [phone]  F: [phone] [] [address]  P: [phone]  F: [phone]  [x] [address]  [address] [address] [facility] [address]   [address]   [place]: [phone]  F: [phone]   [] [facility]  [address] [facility] [address]  [place]: [phone]   F: [phone]     Physical Therapy Cancel/No Show note    Date: 3/16/2023  Patient: Misha Montes De Oca  : 1975  MRN: [id number]    Cancels/No Shows to date:     For today's appointment patient:    []  Cancelled    [] Rescheduled appointment    [] No-show     Reason given by patient:    []  Patient ill    []  Conflicting appointment    [] No transportation      [] Conflict with work    [] No reason given    [] Weather related    [] COVID-19    [x] Other:      Comments: no call, no show. [x] Next appointment was confirmed, no future visits scheduled.      Electronically signed by: Indio Richardson PTA

## 2023-03-17 ENCOUNTER — TELEPHONE (OUTPATIENT)
Dept: ORTHOPEDIC SURGERY | Age: 48
End: 2023-03-17

## 2023-03-17 DIAGNOSIS — M51.36 DDD (DEGENERATIVE DISC DISEASE), LUMBAR: Primary | ICD-10-CM

## 2023-03-17 NOTE — TELEPHONE ENCOUNTER
Results are under the labs tab. \"CRP and Sed rate within normal limits, no concern for infectious process. Recommend referral to pain management to discuss possible Lumbar SANDOR vs. RFAs. Follow up with Dr. Merrill Lisa in 3 months if failure to respond to pain management interventions. \"     Called pt and gave him results. Referral placed for pain management.

## 2023-03-17 NOTE — TELEPHONE ENCOUNTER
Patient called stating he was returning a call on lab results . I do not find a message for lab results in his start. He stated he did get a call on results yesterday, but it doesn't look like anyone for ortho called him. Please advise.

## 2023-03-20 ENCOUNTER — TELEPHONE (OUTPATIENT)
Dept: ORTHOPEDIC SURGERY | Age: 48
End: 2023-03-20

## 2023-03-20 NOTE — TELEPHONE ENCOUNTER
Left detailed message confirming that the phone number on the referral is correct and a working number at [phone], I called the number to verify that it was working.

## 2023-03-20 NOTE — TELEPHONE ENCOUNTER
Received call from patient stating he was referred to pain management but the phone # to where he was referred is no longer a working number & he has been unable to get a hold of pain management. Pt would like to know if the office has an alternative phone # or if there is somewhere else he should go? Please advise.     [phone]

## 2023-03-23 ENCOUNTER — HOSPITAL ENCOUNTER (OUTPATIENT)
Dept: PHYSICAL THERAPY | Facility: CLINIC | Age: 48
Setting detail: THERAPIES SERIES
Discharge: HOME | End: 2023-03-23
Payer: COMMERCIAL

## 2023-03-23 PROCEDURE — 97110 THERAPEUTIC EXERCISES: CPT

## 2023-03-23 NOTE — FLOWSHEET NOTE
hold  Hip Flexor Stretch on Step - 1 x daily - 7 x weekly - 3 sets - 30 (sec) hold  Standing Hip Flexor Stretch - 1 x daily - 7 x weekly - 3 sets - 30 (sec) hold  Standing Gastroc Stretch on Step - 1 x daily - 7 x weekly - 3 sets - 30 (sec) hold  Standing Hamstring Stretch on Chair - 1 x daily - 7 x weekly - 3 sets - 30 (sec) hold  Supine Piriformis Stretch with Foot on Ground - 1 x daily - 7 x weekly - 3 sets - 30 second hold  Piriformis Mobilization with Small Ball - 1 x daily - 7 x weekly - 3 sets - 10 reps    Patient Education  Office Posture    Comprehension of Education:  [x] Verbalizes understanding. [] Demonstrates understanding. [x] Needs review. [] Demonstrates/verbalizes HEP/Ed previously given. Plan: [x] Continue current frequency toward long and short term goals.           Time In: 8:05am              Time Out: 8:50am    Electronically signed by:  Nevaeh Raygoza PTA

## 2023-03-30 ENCOUNTER — HOSPITAL ENCOUNTER (OUTPATIENT)
Dept: PHYSICAL THERAPY | Facility: CLINIC | Age: 48
Setting detail: THERAPIES SERIES
Discharge: HOME | End: 2023-03-30
Payer: COMMERCIAL

## 2023-03-30 PROCEDURE — 97110 THERAPEUTIC EXERCISES: CPT

## 2023-03-30 NOTE — FLOWSHEET NOTE
[x] [facility] [place] [facility]""  Outpatient Rehabilitation &  Therapy  [facility]   [place]: [phone]  F: [phone]      Physical Therapy Daily Treatment Note    Date:  3/30/2023  Patient Name:  Terry Ball    :  1975  MRN: [id number]  Physician: ABRIL Torres                                 Insurance: [facility] 23-23  Medical Diagnosis: U11.455V (ICD-10-CM) - Strain of lumbar region, initial encounter; M51.36 (ICD-10-CM) - DDD (degenerative disc disease), lumbar  Rehab Codes: M62.81 (Muscle Weakness), M62.9 (Disorder of Muscle), M79.1 (Myalgia), Z73.6 (Limitation of ADLs)    Onset Date: 10/2021              Next 's appt. : 4/10/23 Pain management     Visit# / total visits:     Cancels/No Shows: 0/0    Subjective:    Pain:  [x] Yes  [] No Location: low back Pain Rating: (0-10 scale) 6-7/10  Pain altered Tx:  [x] No  [] Yes  Action:  Comments: Patient arrived reporting 6-7/10 pain with standing. Reports he has pain management and his shots scheduled for . Patient reports he got a [employer] and plans to go this weekend and would like to use the pool. Objective: Todays Treatment:  Precautions: No HS stretching due to patient have pain with trunk flexion and does not feel a stretch.   Exercise     Access Code: [email or web address]     LBP- lower crossed syndrome Reps/ Time Weight/ Level Comments             Treadmill Walk  10'  2.0mph               SB Calf Stretch 3x30\"   HEP         Hip ER Stretch 3x30\" Bilateral HEP   Supine hip flexor stretch  1' Bilateral  HEP   Posterior pelvic tilts 10x10\"       PPT+march unable Painful     Bridges x10     Sidelying Hip Abd  2x10       Clamshells 2x10       Quadruped hip ext  x10                4 way hip x15 Green     Total gym squats 2x10 L20                 Other:      Specific Instructions for next treatment: Hip strength ex's, lower cross ex's        Treatment Charges: Mins Units Total units approved    []

## 2023-04-17 DIAGNOSIS — S39.012D STRAIN OF LUMBAR REGION, SUBSEQUENT ENCOUNTER: ICD-10-CM

## 2023-04-17 DIAGNOSIS — M51.36 DDD (DEGENERATIVE DISC DISEASE), LUMBAR: ICD-10-CM

## 2023-04-17 NOTE — TELEPHONE ENCOUNTER
Patient is requesting refills on Ibuprofen 800 mg and Robaxin 500 mg for DDD (degenerative disc disease), lumbar (M51.36); Strain of lumbar region, subsequent encounter (S39.012D.     LRF on both was 3/13/23

## 2023-04-17 NOTE — TELEPHONE ENCOUNTER
Patient is requesting medication refills to be sent to his pharmacy on file. Dr Kar Aparicio advised him to follow up with [facility] for refills. Please advise if any concerns. Pharmacy has been confirmed. Thank you.

## 2023-04-18 RX ORDER — IBUPROFEN 800 MG/1
800 TABLET ORAL 3 TIMES DAILY PRN
Qty: 90 TABLET | Refills: 0 | Status: SHIPPED | OUTPATIENT
Start: 2023-04-18

## 2023-04-18 RX ORDER — METHOCARBAMOL 500 MG/1
500 TABLET, FILM COATED ORAL 4 TIMES DAILY
Qty: 40 TABLET | Refills: 0 | Status: SHIPPED | OUTPATIENT
Start: 2023-04-18 | End: 2023-04-28

## 2023-04-21 ENCOUNTER — HOSPITAL ENCOUNTER (OUTPATIENT)
Dept: MRI IMAGING | Age: 48
End: 2023-04-21
Payer: COMMERCIAL

## 2023-04-21 DIAGNOSIS — R93.89 ABNORMAL MRI: ICD-10-CM

## 2023-04-21 PROCEDURE — 72158 MRI LUMBAR SPINE W/O & W/DYE: CPT

## 2023-04-21 PROCEDURE — A9579 GAD-BASE MR CONTRAST NOS,1ML: HCPCS | Performed by: PAIN MEDICINE

## 2023-04-21 PROCEDURE — 6360000004 HC RX CONTRAST MEDICATION: Performed by: PAIN MEDICINE

## 2023-04-21 RX ADMIN — GADOTERIDOL 20 ML: 279.3 INJECTION, SOLUTION INTRAVENOUS at 15:48

## 2023-05-15 ENCOUNTER — OFFICE VISIT (OUTPATIENT)
Dept: PAIN MANAGEMENT | Age: 48
End: 2023-05-15
Payer: COMMERCIAL

## 2023-05-15 VITALS — BODY MASS INDEX: 30.8 KG/M2 | WEIGHT: 240 LBS | HEIGHT: 74 IN

## 2023-05-15 DIAGNOSIS — M47.817 LUMBOSACRAL SPONDYLOSIS WITHOUT MYELOPATHY: Primary | ICD-10-CM

## 2023-05-15 DIAGNOSIS — M51.36 DEGENERATION OF LUMBAR INTERVERTEBRAL DISC: ICD-10-CM

## 2023-05-15 PROCEDURE — G8417 CALC BMI ABV UP PARAM F/U: HCPCS | Performed by: PAIN MEDICINE

## 2023-05-15 PROCEDURE — 1036F TOBACCO NON-USER: CPT | Performed by: PAIN MEDICINE

## 2023-05-15 PROCEDURE — 99214 OFFICE O/P EST MOD 30 MIN: CPT | Performed by: PAIN MEDICINE

## 2023-05-15 PROCEDURE — G8427 DOCREV CUR MEDS BY ELIG CLIN: HCPCS | Performed by: PAIN MEDICINE

## 2023-05-15 RX ORDER — ALFUZOSIN HYDROCHLORIDE 10 MG/1
TABLET, EXTENDED RELEASE ORAL
COMMUNITY
Start: 2023-05-01

## 2023-05-15 RX ORDER — DOXYCYCLINE HYCLATE 100 MG
100 TABLET ORAL 2 TIMES DAILY
COMMUNITY
Start: 2023-05-01

## 2023-05-15 ASSESSMENT — ENCOUNTER SYMPTOMS
BOWEL INCONTINENCE: 0
BACK PAIN: 1

## 2023-05-15 NOTE — PROGRESS NOTES
acupuncture. Discussed different interventional options such as epidural steroids or medial branch blocks. Also discussed neuromodulation in the form of spinal cord stimulation. Also discussed surgical evaluation. Discussed the importance of continued physical therapy and exercise program as well. At this point he wishes to discuss options with surgeon  We will have him touch base with surgeon. If nothing surgical consider injections    Continue urology follow-up as well    Understands if symptoms were to worsen or he were to develop fevers, chills or weakness to go to the emergency department. Margret Huitron M.D. I have reviewed the chief complaint and history of present illness (including ROS and PFSH) and vital documentation by my staff and I agree with their documentation and have added where applicable.

## 2023-06-01 LAB — PSA, ULTRASENSITIVE: 0.41 NG/ML

## 2023-06-20 DIAGNOSIS — M51.36 DDD (DEGENERATIVE DISC DISEASE), LUMBAR: ICD-10-CM

## 2023-06-20 DIAGNOSIS — S39.012D STRAIN OF LUMBAR REGION, SUBSEQUENT ENCOUNTER: ICD-10-CM

## 2023-06-20 RX ORDER — METHOCARBAMOL 500 MG/1
500 TABLET, FILM COATED ORAL 4 TIMES DAILY
Qty: 40 TABLET | Refills: 0 | Status: CANCELLED | OUTPATIENT
Start: 2023-06-20 | End: 2023-06-30

## 2023-06-20 RX ORDER — IBUPROFEN 800 MG/1
800 TABLET ORAL 3 TIMES DAILY PRN
Qty: 90 TABLET | Refills: 0 | Status: CANCELLED | OUTPATIENT
Start: 2023-06-20

## 2023-06-20 NOTE — TELEPHONE ENCOUNTER
Patient is requesting medication refills to be sent to his pharmacy on file. Dr Laith Stewart advised him to follow up with [facility] for refills.       Ibuprofen 800 mg and Methocarbamol 500mg

## 2023-06-21 RX ORDER — METHOCARBAMOL 500 MG/1
500 TABLET, FILM COATED ORAL 4 TIMES DAILY
Qty: 40 TABLET | Refills: 0 | Status: SHIPPED | OUTPATIENT
Start: 2023-06-21 | End: 2023-07-01

## 2023-06-21 RX ORDER — IBUPROFEN 800 MG/1
800 TABLET ORAL 3 TIMES DAILY PRN
Qty: 90 TABLET | Refills: 0 | Status: SHIPPED | OUTPATIENT
Start: 2023-06-21

## 2023-07-21 ENCOUNTER — OFFICE VISIT (OUTPATIENT)
Dept: PAIN MANAGEMENT | Age: 48
End: 2023-07-21
Payer: COMMERCIAL

## 2023-07-21 VITALS — WEIGHT: 240 LBS | HEIGHT: 74 IN | BODY MASS INDEX: 30.8 KG/M2

## 2023-07-21 DIAGNOSIS — M51.36 DDD (DEGENERATIVE DISC DISEASE), LUMBAR: ICD-10-CM

## 2023-07-21 DIAGNOSIS — S39.012D STRAIN OF LUMBAR REGION, SUBSEQUENT ENCOUNTER: ICD-10-CM

## 2023-07-21 PROBLEM — M51.369 DDD (DEGENERATIVE DISC DISEASE), LUMBAR: Status: ACTIVE | Noted: 2023-07-21

## 2023-07-21 PROBLEM — S39.012A STRAIN OF LUMBAR REGION: Status: ACTIVE | Noted: 2023-07-21

## 2023-07-21 PROCEDURE — 99214 OFFICE O/P EST MOD 30 MIN: CPT | Performed by: NURSE PRACTITIONER

## 2023-07-21 PROCEDURE — G8427 DOCREV CUR MEDS BY ELIG CLIN: HCPCS | Performed by: NURSE PRACTITIONER

## 2023-07-21 PROCEDURE — 1036F TOBACCO NON-USER: CPT | Performed by: NURSE PRACTITIONER

## 2023-07-21 PROCEDURE — G8417 CALC BMI ABV UP PARAM F/U: HCPCS | Performed by: NURSE PRACTITIONER

## 2023-07-21 RX ORDER — IBUPROFEN 800 MG/1
800 TABLET ORAL 3 TIMES DAILY PRN
Qty: 90 TABLET | Refills: 0 | Status: SHIPPED | OUTPATIENT
Start: 2023-07-21

## 2023-07-21 RX ORDER — CYCLOBENZAPRINE HCL 5 MG
5 TABLET ORAL 2 TIMES DAILY PRN
Qty: 60 TABLET | Refills: 0 | Status: SHIPPED | OUTPATIENT
Start: 2023-07-21 | End: 2023-08-20

## 2023-07-21 ASSESSMENT — ENCOUNTER SYMPTOMS
BOWEL INCONTINENCE: 0
COUGH: 0
CONSTIPATION: 0
BACK PAIN: 1
SHORTNESS OF BREATH: 0

## 2023-07-21 NOTE — PROGRESS NOTES
Assessment:  Problem List Items Addressed This Visit       DDD (degenerative disc disease), lumbar    Relevant Medications    ibuprofen (ADVIL;MOTRIN) 800 MG tablet    cyclobenzaprine (FLEXERIL) 5 MG tablet    Strain of lumbar region    Relevant Medications    ibuprofen (ADVIL;MOTRIN) 800 MG tablet          Treatment Plan:  Patient will see Dr. Araceli Daniels next week for surgical opinion  Will provide one month refill of ibuprofen and flexeril - pt understands to make appt with PCP to continue these meds    D/C robaxin  Follow up appointment made for 2 weeks     I have reviewed the chief complaint and history of present illness (including ROS and [address]) and vital documentation by my staff and I agree with their documentation and have added where applicable.

## 2023-07-27 ENCOUNTER — OFFICE VISIT (OUTPATIENT)
Dept: ORTHOPEDIC SURGERY | Age: 48
End: 2023-07-27
Payer: COMMERCIAL

## 2023-07-27 VITALS — RESPIRATION RATE: 14 BRPM | BODY MASS INDEX: 30.8 KG/M2 | HEIGHT: 74 IN | WEIGHT: 240 LBS

## 2023-07-27 DIAGNOSIS — M48.061 SPINAL STENOSIS OF LUMBAR REGION, UNSPECIFIED WHETHER NEUROGENIC CLAUDICATION PRESENT: ICD-10-CM

## 2023-07-27 DIAGNOSIS — M51.36 DDD (DEGENERATIVE DISC DISEASE), LUMBAR: Primary | ICD-10-CM

## 2023-07-27 DIAGNOSIS — M54.16 LUMBAR RADICULAR PAIN: ICD-10-CM

## 2023-07-27 PROCEDURE — 1036F TOBACCO NON-USER: CPT | Performed by: ORTHOPAEDIC SURGERY

## 2023-07-27 PROCEDURE — 99213 OFFICE O/P EST LOW 20 MIN: CPT | Performed by: ORTHOPAEDIC SURGERY

## 2023-07-27 PROCEDURE — G8427 DOCREV CUR MEDS BY ELIG CLIN: HCPCS | Performed by: ORTHOPAEDIC SURGERY

## 2023-07-27 PROCEDURE — G8417 CALC BMI ABV UP PARAM F/U: HCPCS | Performed by: ORTHOPAEDIC SURGERY

## 2023-07-27 NOTE — PROGRESS NOTES
Patient ID: Praveen Griffin is a 52 y.o. male    Chief Compliant:  Chief Complaint   Patient presents with    Back Pain     Lumbar         Diagnostic imaging:    MRI lumbar spine is reviewed advanced degenerative disc disease L5-S1 showing some pretty significant Modic endplate changes disc base collapse some degeneration at 3 4 and 4 5 as well as some congenital stenosis    Assessment and Plan:  1. DDD (degenerative disc disease), lumbar    2. Strain of lumbar region, subsequent encounter      2-year history of progressive low back pain with radiation bilateral legs mostly the upper buttocks patient does have some bilateral testicular region pain that I am unable to explain from the lumbar pathology    Right radicular leg pain definitely worse than left patient with a positive right straight leg raise    At some point with great reservation may consider 5 1 PLIF but would definitely try more conservative things first potentially even a basivertebral nerve    Follow up 10 2    HPI:  This is a 52 y.o. male who presents to the clinic today for low back pain. Patient was previously seen by Claus Crawford PA-C on 3/13/2023    He reports that he has pain in his lower back along the midline which radiates into his bilateral groin and testicles and is worse in the upper buttocks. His pain has been going on for about 24 months and continues to get worse. He has done physical therapy in the past which did not improve his pain. Patient has visited with urology and completed blood work and antibiotics. Review of Systems   All other systems reviewed and are negative.       Past History:    Current Outpatient Medications:     ibuprofen (ADVIL;MOTRIN) 800 MG tablet, Take 1 tablet by mouth 3 times daily as needed for Pain, Disp: 90 tablet, Rfl: 0    cyclobenzaprine (FLEXERIL) 5 MG tablet, Take 1 tablet by mouth 2 times daily as needed for Muscle spasms, Disp: 60 tablet, Rfl: 0    alfuzosin (UROXATRAL) 10 MG extended

## 2023-08-01 ENCOUNTER — OFFICE VISIT (OUTPATIENT)
Dept: PAIN MANAGEMENT | Age: 48
End: 2023-08-01
Payer: COMMERCIAL

## 2023-08-01 VITALS
BODY MASS INDEX: 30.8 KG/M2 | DIASTOLIC BLOOD PRESSURE: 90 MMHG | OXYGEN SATURATION: 97 % | SYSTOLIC BLOOD PRESSURE: 127 MMHG | HEIGHT: 74 IN | WEIGHT: 240 LBS | HEART RATE: 64 BPM

## 2023-08-01 DIAGNOSIS — M51.36 DDD (DEGENERATIVE DISC DISEASE), LUMBAR: ICD-10-CM

## 2023-08-01 DIAGNOSIS — M47.817 LUMBOSACRAL SPONDYLOSIS WITHOUT MYELOPATHY: ICD-10-CM

## 2023-08-01 DIAGNOSIS — M54.51 VERTEBROGENIC LOW BACK PAIN: Primary | ICD-10-CM

## 2023-08-01 PROCEDURE — 1036F TOBACCO NON-USER: CPT | Performed by: ANESTHESIOLOGY

## 2023-08-01 PROCEDURE — 99215 OFFICE O/P EST HI 40 MIN: CPT | Performed by: ANESTHESIOLOGY

## 2023-08-01 PROCEDURE — G8417 CALC BMI ABV UP PARAM F/U: HCPCS | Performed by: ANESTHESIOLOGY

## 2023-08-01 PROCEDURE — G8427 DOCREV CUR MEDS BY ELIG CLIN: HCPCS | Performed by: ANESTHESIOLOGY

## 2023-08-01 ASSESSMENT — ENCOUNTER SYMPTOMS
RESPIRATORY NEGATIVE: 1
BACK PAIN: 1
GASTROINTESTINAL NEGATIVE: 1

## 2023-08-01 NOTE — PROGRESS NOTES
The patient is a 52 y. o. Non- / non  male. Chief Complaint   Patient presents with    Back Pain     2nd Opinion from Dr Rodrigo Vega        66-year-old pleasant male with history of chronic low back pain onset 3+ years ago  No particular injury associated with the onset of symptoms   located in the lower lumbar area in midline  No dermatomal radiation  No associated numbness or paresthesia  No change in bladder or bowel control  No previous spine injections  Previous spine surgical history  Did extensive physical therapy in 2022 with limited benefit  Patient has tried NSAIDs and muscle relaxant    Pain is constant aggravated lifting bending twisting turning  Relieving factors are rest    Was recently evaluated by spine surgery Dr. Casey Acevedo  Had recent MRI lumbar spine    MRI lumbar spine was reviewed images reviewed independently  Finding discussed with patient  Severe degenerative disc changes with Modic type I changes and endplate degeneration involving L4-L5 and S1 vertebrae    I concur with the spine surgeons recommendation for Intracept procedure for basivertebral nerve ablation at L4-L5 and S1  Procedure discussed at length with patient  Information material provided  He is interested  We will submit for prior authorization    Patient is here today for: lower back pain  Pain level: 7/10  Character: weakness, sharp  Radiating:Yes R Leg  Weakness or numbness: both  Aggravating Factors: anything  Alleviating Factors: nothing  Constant or intermitting: constant  Bladder/bowel loss: no      Past Medical History:   Diagnosis Date    DDD (degenerative disc disease), lumbar 7/21/2023    Substance abuse ([address])         History reviewed. No pertinent surgical history.     Social History     Socioeconomic History    Marital status: Single     Spouse name: None    Number of children: None    Years of education: None    Highest education level: None   Tobacco Use    Smoking status: Former     Packs/day: 1.00

## 2023-08-15 ENCOUNTER — TELEPHONE (OUTPATIENT)
Dept: PAIN MANAGEMENT | Age: 48
End: 2023-08-15

## 2023-08-15 NOTE — TELEPHONE ENCOUNTER
Pt got a letter for  insurance that his Intercpet procedure is approved hes excited and ready for a date please advise

## 2023-09-13 ENCOUNTER — TELEPHONE (OUTPATIENT)
Dept: PAIN MANAGEMENT | Age: 48
End: 2023-09-13

## 2023-09-13 NOTE — TELEPHONE ENCOUNTER
I called patient to inform him of Intracept and PAT dates and times. Patient states he has a hearing on the same date as OR; would like to wait until the next available surgery date. Patient states he will call the [place] office.

## 2023-10-30 ENCOUNTER — TELEPHONE (OUTPATIENT)
Dept: PAIN MANAGEMENT | Age: 48
End: 2023-10-30

## 2023-10-30 NOTE — TELEPHONE ENCOUNTER
No Answer No VM box I called to see if pt can go on [facility] schedule on 11/06/2023 for Intracept procedure will rebekah to let [facility] AND [facility] scheduling know and ask dr Sheridan Kyle for order if pt says yes If the 930 am slot is still o pen he will have to arrive @ 8 am and he will need follow up to procedure and PAT from  [facility]  you talk to

## 2023-11-16 ENCOUNTER — OFFICE VISIT (OUTPATIENT)
Dept: ORTHOPEDIC SURGERY | Age: 48
End: 2023-11-16
Payer: COMMERCIAL

## 2023-11-16 VITALS — WEIGHT: 276 LBS | HEIGHT: 74 IN | RESPIRATION RATE: 16 BRPM | BODY MASS INDEX: 35.42 KG/M2

## 2023-11-16 DIAGNOSIS — M51.36 DDD (DEGENERATIVE DISC DISEASE), LUMBAR: Primary | ICD-10-CM

## 2023-11-16 DIAGNOSIS — M47.817 LUMBOSACRAL SPONDYLOSIS WITHOUT MYELOPATHY: ICD-10-CM

## 2023-11-16 PROCEDURE — G8417 CALC BMI ABV UP PARAM F/U: HCPCS | Performed by: PHYSICIAN ASSISTANT

## 2023-11-16 PROCEDURE — 1036F TOBACCO NON-USER: CPT | Performed by: PHYSICIAN ASSISTANT

## 2023-11-16 PROCEDURE — G8484 FLU IMMUNIZE NO ADMIN: HCPCS | Performed by: PHYSICIAN ASSISTANT

## 2023-11-16 PROCEDURE — 99213 OFFICE O/P EST LOW 20 MIN: CPT | Performed by: PHYSICIAN ASSISTANT

## 2023-11-16 PROCEDURE — G8427 DOCREV CUR MEDS BY ELIG CLIN: HCPCS | Performed by: PHYSICIAN ASSISTANT

## 2023-11-16 RX ORDER — PREDNISONE 10 MG/1
TABLET ORAL
Qty: 15 TABLET | Refills: 0 | Status: SHIPPED | OUTPATIENT
Start: 2023-11-16 | End: 2023-11-26

## 2023-11-16 RX ORDER — IBUPROFEN 800 MG/1
800 TABLET ORAL EVERY 8 HOURS PRN
Qty: 90 TABLET | Refills: 0 | Status: SHIPPED | OUTPATIENT
Start: 2023-11-16 | End: 2023-12-16

## 2023-11-16 RX ORDER — CYCLOBENZAPRINE HCL 10 MG
10 TABLET ORAL NIGHTLY PRN
Qty: 10 TABLET | Refills: 0 | Status: SHIPPED | OUTPATIENT
Start: 2023-11-16 | End: 2023-11-26

## 2023-11-16 NOTE — PROGRESS NOTES
sensory deficit. Psychiatric:         Behavior: Behavior normal.         Thought Content: Thought content normal.  Skin:     General: Skin is warm and dry. Musculoskeletal:      Comments: Normal gait  Patient appears to be very physically fit. She has a positive straight leg raise on the right and is mildly tender to the right buttock. Patient is leaning to the left to alleviate some of his symptoms. Diagnostic imaging:    No new imaging today. Assessment and Plan:  1. DDD (degenerative disc disease), lumbar    2. Lumbosacral spondylosis without myelopathy      Patient 51-year-old male presenting today with ongoing 2-year history of progressive low back pain with radiation to bilateral legs and the back. Patient has failed physical therapy and most medication management at this time. Intensity management and work-up is in process to help with intercept procedure. Spoke with the patient about continue to consider this procedure to see if it provides any sort of relief. Patient plans on following back up with pain management to consider procedure. Prescribed ibuprofen 800 refilled Flexeril and gave him some prednisone in the interim. Follow up in 3 months. Oksana Tripathi PA-C    11/17/2023 11:12 AM        Please note that this chart was generated using voice recognition Dragon dictation software. Although every effort was made to ensure the accuracy of this automated transcription, some errors in transcription may have occurred.

## 2024-04-23 ENCOUNTER — TELEPHONE (OUTPATIENT)
Dept: PAIN MANAGEMENT | Age: 49
End: 2024-04-23

## 2024-04-23 NOTE — TELEPHONE ENCOUNTER
Pt called and stated he moved to [place], [place] and would like to know if there is a provider down there that would be recommend to continue with care and if he can have a referral. Please contact pt.

## 2024-07-16 ENCOUNTER — OFFICE VISIT (OUTPATIENT)
Dept: PAIN MANAGEMENT | Age: 49
End: 2024-07-16
Payer: COMMERCIAL

## 2024-07-16 VITALS — BODY MASS INDEX: 32.34 KG/M2 | HEIGHT: 74 IN | HEART RATE: 56 BPM | OXYGEN SATURATION: 98 % | WEIGHT: 252 LBS

## 2024-07-16 DIAGNOSIS — M54.51 VERTEBROGENIC LOW BACK PAIN: Primary | ICD-10-CM

## 2024-07-16 PROCEDURE — G8417 CALC BMI ABV UP PARAM F/U: HCPCS | Performed by: ANESTHESIOLOGY

## 2024-07-16 PROCEDURE — 1036F TOBACCO NON-USER: CPT | Performed by: ANESTHESIOLOGY

## 2024-07-16 PROCEDURE — 99214 OFFICE O/P EST MOD 30 MIN: CPT | Performed by: ANESTHESIOLOGY

## 2024-07-16 PROCEDURE — G8427 DOCREV CUR MEDS BY ELIG CLIN: HCPCS | Performed by: ANESTHESIOLOGY

## 2024-07-16 ASSESSMENT — ENCOUNTER SYMPTOMS
SORE THROAT: 0
GASTROINTESTINAL NEGATIVE: 1
BACK PAIN: 1
NAUSEA: 0
CHEST TIGHTNESS: 0
DIARRHEA: 0
CONSTIPATION: 0
SHORTNESS OF BREATH: 0
VOMITING: 0

## 2024-07-16 NOTE — PROGRESS NOTES
The patient is a 48 y.o.Non- / non  male.    Chief Complaint   Patient presents with    Back Pain        Back Pain  Associated symptoms include weakness. Pertinent negatives include no chest pain, fever or numbness.     This is a 48-year-old man with history of chronic low back pain going on for more than 5 years  Pain located in the lower lumbar area predominantly axial in midline aggravates with bending lifting twisting  Pain interferes with regular activities  Have done multiple courses of physical therapy he is physically active continue to do home exercises  Patient has tried NSAIDs and muscle relaxant  Last diagnostic workup with MRI lumbar spine showed a severe disc degenerative changes with endplate degeneration involving superior endplate of L4 and inferior endplate of L5 and superior endplate of S1  Severe L5-S1 degenerative disc changes and Modic type I changes  We have suggested Intracept procedure at L4-L5 and S1 with basivertebral nerve ablation  Patient wanted to have a second opinion at [facility]  He was last seen in August Returns today for same symptoms markedly affecting quality of life from ongoing axial back pain    He wished to proceed with Intracept procedure  Procedure discussed at length  Information material provided  Will submit for    Prior authorization  Tentative date August 22 [facility] at 7 AM  Will set up for preanesthesia evaluation  Patient is here today for: discuss procedure   Pain level: 10   Character: flares up(thorbbing), stabbing   Radiating: into the groin area   Weakness or numbness: weakness in back   Aggravating Factors: bending over at certain angles can bring the pain, standing for long periods of time   Alleviating Factors: otc excedrine, asa, stretching   Constant or intermitting: constant   Bladder/bowel loss: no       Past Medical History:   Diagnosis Date    DDD (degenerative disc disease), lumbar 7/21/2023    Substance abuse

## 2024-10-24 ENCOUNTER — HOSPITAL ENCOUNTER (OUTPATIENT)
Dept: PREADMISSION TESTING | Age: 49
Discharge: HOME | End: 2024-10-28

## 2024-10-24 VITALS — BODY MASS INDEX: 32.08 KG/M2 | HEIGHT: 74 IN | WEIGHT: 250 LBS

## 2024-10-24 NOTE — PROGRESS NOTES
alert and stable, you will receive instructions and be prepared for discharge.     INSTRUCTIONS REVIEWED WITH BARBRA. VERBALIZED UNDERSTANDING.   INTRACEPT WITH KEYSHA ON 10/31/24 @ 4011.  Writer informed patient he MUST have a ride the day of surgery.

## 2024-10-30 ENCOUNTER — ANESTHESIA EVENT (OUTPATIENT)
Dept: OPERATING ROOM | Age: 49
End: 2024-10-30
Payer: COMMERCIAL

## 2024-10-31 ENCOUNTER — APPOINTMENT (OUTPATIENT)
Dept: GENERAL RADIOLOGY | Age: 49
End: 2024-10-31
Attending: ANESTHESIOLOGY
Payer: COMMERCIAL

## 2024-10-31 ENCOUNTER — ANESTHESIA (OUTPATIENT)
Dept: OPERATING ROOM | Age: 49
End: 2024-10-31
Payer: COMMERCIAL

## 2024-10-31 ENCOUNTER — HOSPITAL ENCOUNTER (OUTPATIENT)
Age: 49
Setting detail: OUTPATIENT SURGERY
Discharge: HOME | End: 2024-10-31
Attending: ANESTHESIOLOGY | Admitting: ANESTHESIOLOGY
Payer: COMMERCIAL

## 2024-10-31 VITALS
WEIGHT: 250 LBS | DIASTOLIC BLOOD PRESSURE: 71 MMHG | OXYGEN SATURATION: 100 % | TEMPERATURE: 97.1 F | HEART RATE: 68 BPM | HEIGHT: 74 IN | BODY MASS INDEX: 32.08 KG/M2 | SYSTOLIC BLOOD PRESSURE: 120 MMHG | RESPIRATION RATE: 16 BRPM

## 2024-10-31 DIAGNOSIS — G89.18 POST-OP PAIN: Primary | ICD-10-CM

## 2024-10-31 DIAGNOSIS — M47.817 LUMBOSACRAL SPONDYLOSIS WITHOUT MYELOPATHY: ICD-10-CM

## 2024-10-31 DIAGNOSIS — M54.51 VERTEBROGENIC LOW BACK PAIN: ICD-10-CM

## 2024-10-31 PROCEDURE — C1889 IMPLANT/INSERT DEVICE, NOC: HCPCS | Performed by: ANESTHESIOLOGY

## 2024-10-31 PROCEDURE — 7100000001 HC PACU RECOVERY - ADDTL 15 MIN: Performed by: ANESTHESIOLOGY

## 2024-10-31 PROCEDURE — 3600000013 HC SURGERY LEVEL 3 ADDTL 15MIN: Performed by: ANESTHESIOLOGY

## 2024-10-31 PROCEDURE — 3700000001 HC ADD 15 MINUTES (ANESTHESIA): Performed by: ANESTHESIOLOGY

## 2024-10-31 PROCEDURE — 64628 TRML DSTRJ IOS BVN 1ST 2 L/S: CPT | Performed by: ANESTHESIOLOGY

## 2024-10-31 PROCEDURE — 7100000031 HC ASPR PHASE II RECOVERY - ADDTL 15 MIN: Performed by: ANESTHESIOLOGY

## 2024-10-31 PROCEDURE — 64629 TRML DSTRJ IOS BVN EA ADDL: CPT | Performed by: ANESTHESIOLOGY

## 2024-10-31 PROCEDURE — 7100000000 HC PACU RECOVERY - FIRST 15 MIN: Performed by: ANESTHESIOLOGY

## 2024-10-31 PROCEDURE — 2500000003 HC RX 250 WO HCPCS: Performed by: ANESTHESIOLOGY

## 2024-10-31 PROCEDURE — 7100000030 HC ASPR PHASE II RECOVERY - FIRST 15 MIN: Performed by: ANESTHESIOLOGY

## 2024-10-31 PROCEDURE — 2580000003 HC RX 258: Performed by: ANESTHESIOLOGY

## 2024-10-31 PROCEDURE — 2500000003 HC RX 250 WO HCPCS: Performed by: NURSE ANESTHETIST, CERTIFIED REGISTERED

## 2024-10-31 PROCEDURE — 3600000003 HC SURGERY LEVEL 3 BASE: Performed by: ANESTHESIOLOGY

## 2024-10-31 PROCEDURE — 3700000000 HC ANESTHESIA ATTENDED CARE: Performed by: ANESTHESIOLOGY

## 2024-10-31 PROCEDURE — 7100000011 HC PHASE II RECOVERY - ADDTL 15 MIN: Performed by: ANESTHESIOLOGY

## 2024-10-31 PROCEDURE — 6360000002 HC RX W HCPCS: Performed by: NURSE ANESTHETIST, CERTIFIED REGISTERED

## 2024-10-31 PROCEDURE — 7100000010 HC PHASE II RECOVERY - FIRST 15 MIN: Performed by: ANESTHESIOLOGY

## 2024-10-31 PROCEDURE — 2709999900 HC NON-CHARGEABLE SUPPLY: Performed by: ANESTHESIOLOGY

## 2024-10-31 RX ORDER — NALOXONE HYDROCHLORIDE 0.4 MG/ML
INJECTION, SOLUTION INTRAMUSCULAR; INTRAVENOUS; SUBCUTANEOUS PRN
Status: DISCONTINUED | OUTPATIENT
Start: 2024-10-31 | End: 2024-10-31 | Stop reason: HOSPADM

## 2024-10-31 RX ORDER — DIPHENHYDRAMINE HYDROCHLORIDE 50 MG/ML
12.5 INJECTION INTRAMUSCULAR; INTRAVENOUS
Status: DISCONTINUED | OUTPATIENT
Start: 2024-10-31 | End: 2024-10-31 | Stop reason: HOSPADM

## 2024-10-31 RX ORDER — HYDROCODONE BITARTRATE AND ACETAMINOPHEN 5; 325 MG/1; MG/1
1 TABLET ORAL EVERY 8 HOURS PRN
Qty: 15 TABLET | Refills: 0 | Status: SHIPPED | OUTPATIENT
Start: 2024-10-31 | End: 2024-11-05

## 2024-10-31 RX ORDER — SODIUM CHLORIDE, SODIUM LACTATE, POTASSIUM CHLORIDE, CALCIUM CHLORIDE 600; 310; 30; 20 MG/100ML; MG/100ML; MG/100ML; MG/100ML
INJECTION, SOLUTION INTRAVENOUS CONTINUOUS
Status: DISCONTINUED | OUTPATIENT
Start: 2024-10-31 | End: 2024-10-31 | Stop reason: HOSPADM

## 2024-10-31 RX ORDER — BUPIVACAINE HYDROCHLORIDE AND EPINEPHRINE 5; 5 MG/ML; UG/ML
INJECTION, SOLUTION EPIDURAL; INTRACAUDAL; PERINEURAL PRN
Status: DISCONTINUED | OUTPATIENT
Start: 2024-10-31 | End: 2024-10-31 | Stop reason: ALTCHOICE

## 2024-10-31 RX ORDER — SODIUM CHLORIDE 9 MG/ML
INJECTION, SOLUTION INTRAVENOUS PRN
Status: DISCONTINUED | OUTPATIENT
Start: 2024-10-31 | End: 2024-10-31 | Stop reason: HOSPADM

## 2024-10-31 RX ORDER — ONDANSETRON 2 MG/ML
4 INJECTION INTRAMUSCULAR; INTRAVENOUS
Status: DISCONTINUED | OUTPATIENT
Start: 2024-10-31 | End: 2024-10-31 | Stop reason: HOSPADM

## 2024-10-31 RX ORDER — CEFAZOLIN SODIUM 1 G/3ML
INJECTION, POWDER, FOR SOLUTION INTRAMUSCULAR; INTRAVENOUS
Status: DISCONTINUED | OUTPATIENT
Start: 2024-10-31 | End: 2024-10-31 | Stop reason: SDUPTHER

## 2024-10-31 RX ORDER — PROPOFOL 10 MG/ML
INJECTION, EMULSION INTRAVENOUS
Status: DISCONTINUED | OUTPATIENT
Start: 2024-10-31 | End: 2024-10-31 | Stop reason: SDUPTHER

## 2024-10-31 RX ORDER — MIDAZOLAM HYDROCHLORIDE 1 MG/ML
INJECTION, SOLUTION INTRAMUSCULAR; INTRAVENOUS
Status: DISCONTINUED | OUTPATIENT
Start: 2024-10-31 | End: 2024-10-31 | Stop reason: SDUPTHER

## 2024-10-31 RX ORDER — SODIUM CHLORIDE 0.9 % (FLUSH) 0.9 %
5-40 SYRINGE (ML) INJECTION EVERY 12 HOURS SCHEDULED
Status: DISCONTINUED | OUTPATIENT
Start: 2024-10-31 | End: 2024-10-31 | Stop reason: HOSPADM

## 2024-10-31 RX ORDER — METOCLOPRAMIDE HYDROCHLORIDE 5 MG/ML
10 INJECTION INTRAMUSCULAR; INTRAVENOUS
Status: DISCONTINUED | OUTPATIENT
Start: 2024-10-31 | End: 2024-10-31 | Stop reason: HOSPADM

## 2024-10-31 RX ORDER — SODIUM CHLORIDE 0.9 % (FLUSH) 0.9 %
5-40 SYRINGE (ML) INJECTION PRN
Status: DISCONTINUED | OUTPATIENT
Start: 2024-10-31 | End: 2024-10-31 | Stop reason: HOSPADM

## 2024-10-31 RX ORDER — METHOCARBAMOL 750 MG/1
750 TABLET, FILM COATED ORAL 3 TIMES DAILY
Qty: 15 TABLET | Refills: 0 | Status: SHIPPED | OUTPATIENT
Start: 2024-10-31 | End: 2024-11-05

## 2024-10-31 RX ORDER — FENTANYL CITRATE 0.05 MG/ML
25 INJECTION, SOLUTION INTRAMUSCULAR; INTRAVENOUS EVERY 5 MIN PRN
Status: DISCONTINUED | OUTPATIENT
Start: 2024-10-31 | End: 2024-10-31 | Stop reason: HOSPADM

## 2024-10-31 RX ORDER — FENTANYL CITRATE 50 UG/ML
INJECTION, SOLUTION INTRAMUSCULAR; INTRAVENOUS
Status: DISCONTINUED | OUTPATIENT
Start: 2024-10-31 | End: 2024-10-31 | Stop reason: SDUPTHER

## 2024-10-31 RX ORDER — LIDOCAINE HYDROCHLORIDE 20 MG/ML
INJECTION, SOLUTION EPIDURAL; INFILTRATION; INTRACAUDAL; PERINEURAL
Status: DISCONTINUED | OUTPATIENT
Start: 2024-10-31 | End: 2024-10-31 | Stop reason: SDUPTHER

## 2024-10-31 RX ORDER — LIDOCAINE HYDROCHLORIDE 10 MG/ML
1 INJECTION, SOLUTION EPIDURAL; INFILTRATION; INTRACAUDAL; PERINEURAL
Status: DISCONTINUED | OUTPATIENT
Start: 2024-10-31 | End: 2024-10-31 | Stop reason: HOSPADM

## 2024-10-31 RX ADMIN — FENTANYL CITRATE 50 MCG: 50 INJECTION INTRAMUSCULAR; INTRAVENOUS at 07:36

## 2024-10-31 RX ADMIN — LIDOCAINE HYDROCHLORIDE 60 MG: 20 INJECTION, SOLUTION EPIDURAL; INFILTRATION; INTRACAUDAL; PERINEURAL at 07:42

## 2024-10-31 RX ADMIN — FENTANYL CITRATE 50 MCG: 50 INJECTION INTRAMUSCULAR; INTRAVENOUS at 07:47

## 2024-10-31 RX ADMIN — PROPOFOL 50 MCG/KG/MIN: 10 INJECTION, EMULSION INTRAVENOUS at 07:42

## 2024-10-31 RX ADMIN — MIDAZOLAM 2 MG: 1 INJECTION INTRAMUSCULAR; INTRAVENOUS at 07:31

## 2024-10-31 RX ADMIN — CEFAZOLIN 2 G: 1 INJECTION, POWDER, FOR SOLUTION INTRAMUSCULAR; INTRAVENOUS at 07:40

## 2024-10-31 RX ADMIN — SODIUM CHLORIDE, POTASSIUM CHLORIDE, SODIUM LACTATE AND CALCIUM CHLORIDE: 600; 310; 30; 20 INJECTION, SOLUTION INTRAVENOUS at 07:25

## 2024-10-31 ASSESSMENT — ENCOUNTER SYMPTOMS
ABDOMINAL PAIN: 0
SORE THROAT: 0
SHORTNESS OF BREATH: 0
VOMITING: 0
BACK PAIN: 1
DIARRHEA: 0
CONSTIPATION: 0
COUGH: 0
NAUSEA: 0

## 2024-10-31 ASSESSMENT — PAIN DESCRIPTION - DESCRIPTORS: DESCRIPTORS: ACHING

## 2024-10-31 ASSESSMENT — PAIN SCALES - GENERAL
PAINLEVEL_OUTOF10: 0
PAINLEVEL_OUTOF10: 0

## 2024-10-31 ASSESSMENT — PAIN - FUNCTIONAL ASSESSMENT
PAIN_FUNCTIONAL_ASSESSMENT: 0-10

## 2024-10-31 NOTE — ANESTHESIA PRE PROCEDURE
Department of Anesthesiology  Preprocedure Note       Name:  Adryan Lorenzo   Age:  49 y.o.  :  1975                                          MRN:  [id number]         Date:  10/31/2024      Surgeon: Surgeon(s):  Rj Cotton MD    Procedure: Procedure(s):  INTRACEPT L4-L5-S1    Medications prior to admission:   Prior to Admission medications    Not on File       Current medications:    Current Facility-Administered Medications   Medication Dose Route Frequency Provider Last Rate Last Admin   • lidocaine PF 1 % injection 1 mL  1 mL IntraDERmal Once PRN Yanick Bernal MD       • lactated ringers infusion   IntraVENous Continuous Yanick Bernal MD       • sodium chloride flush 0.9 % injection 5-40 mL  5-40 mL IntraVENous 2 times per day Yanick Bernal MD       • sodium chloride flush 0.9 % injection 5-40 mL  5-40 mL IntraVENous PRN Yanick Bernal MD       • 0.9 % sodium chloride infusion   IntraVENous PRN Yanick Bernal MD           Allergies:  No Known Allergies    Problem List:    Patient Active Problem List   Diagnosis Code   • Tobacco abuse disorder Z72.0   • Tobacco abuse counseling Z71.6   • Class 1 obesity in adult E66.811   • Other insomnia G47.09   • DDD (degenerative disc disease), lumbar M51.369   • Strain of lumbar region S39.012A   • Spinal stenosis of lumbar region M48.061   • Lumbar radicular pain M54.16   • Vertebrogenic low back pain M54.51   • Lumbosacral spondylosis without myelopathy M47.817       Past Medical History:        Diagnosis Date   • DDD (degenerative disc disease), lumbar 2023   • Substance abuse (HCC)     history of (no longer)        Past Surgical History:  History reviewed. No pertinent surgical history.    Social History:    Social History     Tobacco Use   • Smoking status: Former     Current packs/day: 1.00     Average packs/day: 1 pack/day for 20.0 years (20.0 ttl pk-yrs)     Types: Cigarettes   • Smokeless tobacco: Never   • Tobacco comments:

## 2024-10-31 NOTE — ANESTHESIA POSTPROCEDURE EVALUATION
Department of Anesthesiology  Postprocedure Note    Patient: Adryan Lorenzo  MRN: [id number]  YOB: 1975  Date of evaluation: 10/31/2024    Procedure Summary       Date: 10/31/24 Room / Location: [address]    Anesthesia Start: 0730 Anesthesia Stop: 0848    Procedure: INTRACEPT L4-L5-S1 (Back) Diagnosis:       Vertebrogenic low back pain      (Vertebrogenic low back pain [M54.51])    Surgeons: Rj Cotton MD Responsible Provider: Arvind Sol MD    Anesthesia Type: general ASA Status: 3            Anesthesia Type: No value filed.    Edilberto Phase I: Edilberto Score: 10    Edilberto Phase II:      Anesthesia Post Evaluation    Patient location during evaluation: PACU  Patient participation: complete - patient participated  Level of consciousness: awake and alert  Airway patency: patent  Nausea & Vomiting: no vomiting  Cardiovascular status: hemodynamically stable  Respiratory status: acceptable  Hydration status: euvolemic  Comments: POST- ANESTHESIA EVALUATION       Pt Name: Adryan Lorenzo  MRN: [id number]  YOB: 1975  Date of evaluation: 10/31/2024  Time:  9:23 AM      BP (!) 162/90   Pulse 72   Temp 97.8 °F (36.6 °C)   Resp 14   Ht 1.88 m (6' 2.02\")   Wt 113.4 kg (250 lb)   SpO2 98%   BMI 32.08 kg/m²      Consciousness Level  Awake  Cardiopulmonary Status  Stable  Pain Adequately Treated YES  Nausea / Vomiting  NO  Adequate Hydration  YES  Anesthesia Related Complications NONE      Electronically signed by Arvind Sol MD on 10/31/2024 at 9:23 AM         Pain management: satisfactory to patient    No notable events documented.

## 2024-10-31 NOTE — H&P
HISTORY and PHYSICAL  [facility]       NAME:  Adryan Lorenzo  MRN: [id number]   YOB: 1975   Date: 10/31/2024   Age: 49 y.o.  Gender: male       COMPLAINT AND PRESENT HISTORY:     Adryan Lorenzo is 49 y.o.  male, here for     Procedure(s):  INTRACEPT L4-L5-S1    Pre-Op Diagnosis Codes:      * Vertebrogenic low back pain [M54.51]    Below italics a portion of office visit note by Dr. Cotton dated 07/16/24: Reviewed   Back Pain  Associated symptoms include weakness. Pertinent negatives include no chest pain, fever or numbness.      This is a 48-year-old man with history of chronic low back pain going on for more than 5 years  Pain located in the lower lumbar area predominantly axial in midline aggravates with bending lifting twisting  Pain interferes with regular activities  Have done multiple courses of physical therapy he is physically active continue to do home exercises  Patient has tried NSAIDs and muscle relaxant  Last diagnostic workup with MRI lumbar spine showed a severe disc degenerative changes with endplate degeneration involving superior endplate of L4 and inferior endplate of L5 and superior endplate of S1  Severe L5-S1 degenerative disc changes and Modic type I changes  We have suggested Intracept procedure at L4-L5 and S1 with basivertebral nerve ablation  Patient wanted to have a second opinion at [facility]  He was last seen in August  Returns today for same symptoms markedly affecting quality of life from ongoing axial back pain    UPDATE:   Pt c/o pain to lower back with radiation into groin.  Describes pain as constant.  Rating pain 8-10/10.  Does not take pain medications.   Bending, lifting and twisting, standing up from seated position aggravates pain.  Heat helps alleviate symptoms.  Denies numbness and tingling.   Denies recent fall, injury or trauma.  Denies redness or rash to surgical site.   Denies hx of MRSA infection.     NPO p MN. Took no medications  Head: Normocephalic and atraumatic.      Nose: Nose normal.      Mouth/Throat:      Mouth: Mucous membranes are moist.      Pharynx: Oropharynx is clear. No oropharyngeal exudate or posterior oropharyngeal erythema.   Eyes:      General: No scleral icterus.        Right eye: No discharge.         Left eye: No discharge.      Pupils: Pupils are equal, round, and reactive to light.   Neck:      Trachea: No tracheal deviation.   Cardiovascular:      Rate and Rhythm: Normal rate and regular rhythm.      Heart sounds: Normal heart sounds. No murmur heard.     No friction rub. No gallop.   Pulmonary:      Effort: Pulmonary effort is normal. No respiratory distress.      Breath sounds: Normal breath sounds. No wheezing, rhonchi or rales.   Abdominal:      General: Bowel sounds are normal. There is no distension.      Palpations: Abdomen is soft.      Tenderness: There is no abdominal tenderness. There is no guarding.   Musculoskeletal:      Cervical back: Neck supple.   Skin:     General: Skin is warm and dry.      Coloration: Skin is not jaundiced.      Findings: No bruising, erythema or rash.   Neurological:      General: No focal deficit present.      Mental Status: He is alert and oriented to person, place, and time.      Cranial Nerves: No cranial nerve deficit.      Gait: Gait normal.   Psychiatric:         Mood and Affect: Mood normal.        PROVISIONAL DIAGNOSES / SURGERY:      INTRACEPT L4-L5-S1    Pre-Op Diagnosis Codes:      * Vertebrogenic low back pain [M54.51]     Patient Active Problem List    Diagnosis Date Noted    Vertebrogenic low back pain 08/01/2023    Lumbosacral spondylosis without myelopathy 08/01/2023    Spinal stenosis of lumbar region 07/27/2023    Lumbar radicular pain 07/27/2023    DDD (degenerative disc disease), lumbar 07/21/2023    Strain of lumbar region 07/21/2023    Tobacco abuse disorder 02/07/2022    Tobacco abuse counseling 02/07/2022    Class 1 obesity in adult 02/07/2022    Other

## 2024-10-31 NOTE — PROGRESS NOTES
CLINICAL PHARMACY NOTE: MEDS TO BEDS    Total # of Prescriptions Filled: 2   The following medications were delivered to the patient:  Hydrocodone/APAP 5-325MG Tablets   Methocarbamol 750MG Tablets    Additional Documentation:  Picked up at the pharmacy by Radha Reynaga at 9:58AM 10/31/24

## 2024-10-31 NOTE — OP NOTE
Operative Note      Patient: Adryan Lorenzo  YOB: 1975  MRN: [id number]    Date of Procedure: 10/31/2024    Pre-Op Diagnosis Codes:      * Vertebrogenic low back pain [M54.51]    Post-Op Diagnosis: Same       Procedure(s):  INTRACEPT L4-L5-S1    Surgeon(s):  Rj Cotton MD    Assistant:   * No surgical staff found *    Anesthesia: Monitor Anesthesia Care    Estimated Blood Loss (mL): Minimal    Complications: None    Specimens:   * No specimens in log *    Implants:  * No implants in log *      Drains: * No LDAs found *    Findings:  Infection Present At Time Of Surgery (PATOS) (choose all levels that have infection present):  No infection present  Other Findings: n/a    Detailed Description of Procedure:   Pre-op Diagnosis:   Vertebrogenic low back pain,   Modic changes L4, L5 AND S1 LEVELS    Post-op Diagnosis:   Vertebrogenic low back pain,   Modic changes L4, L5 AND S1 LEVELS    Procedure: Basivertebral nerve (BVN) ablation- Intracept Procedure Vertebrogenic low back pain,   L4, L5 AND S1 LEVELS    Physician/Surgeon: RJ COTTON MD  Anesthesia: MAC    ANTIBIOTICS: IV 2 GM ANCEF    Indications for Procedure:   Chronic axial lumbar spinal pain onset several years ago progressively worsened affecting quality of life, refractory to different modalities including therapy medication management and different spine injections  MRI imaging showed Modic changes involving L4, L5 AND S1 lumbar vertebrae  Clinical examination and imaging concordant with vertebrogenic pain    Procedure Time Out: Patient ID confirmed, correct procedure to be performed, correct site and/or side for procedure as per marked location and correct medication(s), including antibiotic to be used for the procedure    Description of Procedure:     After receiving anesthesia in the supine position, the patient was placed prone on the operating room table and all pressure points were appropriately padded. The back was sterilely prepped  and draped.     L4 LEVEL:  The C-arm was moved to visualize the target at the superolateral aspect of the L4 vertebral body using the approach similar to the L4 vertebral body. The C-arm was rotated to square off the superior endplate at L4 and rotated approximately 35 degrees to obtain an oblique view with the facet in the midpoint of the vertebral body. The superolateral L4 pedicle was identified, and the skin entry point identified and infiltrated with 0.5 % BUPIVACAINE using a 25-gauge 1-1/2 inch needle. A 22-gauge 5-inch spinal needle was used to anesthetize the track to the pedicle and periosteum and confirm the introducer cannula trajectory. A skin incision was made with 10 scalpel blade. The 8-gauge introducer cannula with daimond tip was then introduced through the skin, subcutaneous tissue and paraspinal muscle until bony contact was made. The position was checked in the AP and Lateral plane. Using a mallet, the trocar was then advanced thru the pedicle to the posterior aspect of the vertebral body using a combination of AP and lateral views to ensure appropriate traversing of the pedicle and no breaching of the pedicle medially. Once the trocar was in the posterior aspect of the L4 vertebral body, the trocar was removed from the cannula and the curved cannula assembly with the nitinol J-stylet was inserted. The wingnut was rotated counterclockwise permitting excursion of the J-stylet. The curved cannula assembly was then advanced using a mallet in 1-2 mm increments. The J-stylet was observed to traverse the vertebral body in the AP and lateral views. Target was reached when the tip of the stylet was noted to be between 30-50% forward of the posterior wall of the L4 in the lateral view (midway between the superior and inferior endplates) and it crossed the midline of the L4 spinous process in the AP view. The stylet was then removed. The bipolar radiofrequency (RF) probe was removed from the previous

## 2024-11-01 ENCOUNTER — TELEPHONE (OUTPATIENT)
Dept: PAIN MANAGEMENT | Age: 49
End: 2024-11-01

## 2025-02-07 ENCOUNTER — HOSPITAL ENCOUNTER (OUTPATIENT)
Dept: PAIN MANAGEMENT | Age: 50
Discharge: HOME | End: 2025-02-07
Payer: COMMERCIAL

## 2025-02-07 VITALS — BODY MASS INDEX: 32.08 KG/M2 | HEIGHT: 74 IN | WEIGHT: 250 LBS

## 2025-02-07 DIAGNOSIS — M54.51 VERTEBROGENIC LOW BACK PAIN: ICD-10-CM

## 2025-02-07 DIAGNOSIS — M47.817 LUMBOSACRAL SPONDYLOSIS WITHOUT MYELOPATHY: Primary | ICD-10-CM

## 2025-02-07 PROCEDURE — 99214 OFFICE O/P EST MOD 30 MIN: CPT | Performed by: ANESTHESIOLOGY

## 2025-02-07 PROCEDURE — 99213 OFFICE O/P EST LOW 20 MIN: CPT

## 2025-02-07 ASSESSMENT — ENCOUNTER SYMPTOMS
BACK PAIN: 1
GASTROINTESTINAL NEGATIVE: 1
RESPIRATORY NEGATIVE: 1

## 2025-02-07 ASSESSMENT — PAIN SCALES - GENERAL: PAINLEVEL_OUTOF10: 8

## 2025-02-07 NOTE — H&P (VIEW-ONLY)
coordination.    Pupils:  Pupils are equal, round, and reactive to light.  Pupils are equal.   Skin:  Warm and dry.  No rash or cyanosis.   Lumbar spine examination  Range of motion preserved but associated with pain particularly in spine extension  Tenderness to palpation in lumbar area  Facet loading positive  Gait is stable  Motor strength 5/5 in both lower extremities  Able to ambulate without any assistance    Assessment & Plan  1. Lumbosacral spondylosis without myelopathy    2. Vertebrogenic low back pain        Orders Placed This Encounter   Procedures    INJ DX/THER AGNT PARAVERT FACET JOINT, LUMBAR/SAC, 1ST LEVEL      No orders of the defined types were placed in this encounter.           Electronically signed by Rj Cotton MD on 2/7/2025 at 2:41 PM

## 2025-02-07 NOTE — PROGRESS NOTES
The patient is a 49 y.o.Non- / non  male.    Chief Complaint   Patient presents with    Back Pain    Follow Up After Procedure     Intracept         This is a very pleasant 48-year-old gentleman physically active, history of 5+ year history of low back pain located in the lower lumbosacral area across midline on both sides aggravated with twisting turning bending and extension  Patient have done multiple course of physical therapy and continue to do home exercises  MRI lumbar spine showed lower lumbar spine degenerative changes and facet arthropathy  He has failed NSAIDs and muscle relaxant  Pain is affecting quality of life and activity level  Oswestry disability index to moderate disability associated with pain  Patient had Intracept procedure and reports no significant improvement in pain  Clinical presentation suggest possible facet mediated pain  Discussed diagnostic bilateral lumbar medial branch nerve block at L4-5 and L5-S1 facet with fluoroscopy guidance  If this provide 80% plus relief with improved range of motion and activity tolerance, we will then consider for confirmatory block and radiofrequency ablation    Average pain score is 8/10    Outcome   Any improvement of activity?  No   Any side effects (appetite,leg cramping,facial fleshing): No    Increase of pain:  No  Pain score Today:  8  % of pain relief: 0%  Pain diary (medial branch block):     No results found for: \"LABA1C\"         Past Medical History:   Diagnosis Date    DDD (degenerative disc disease), lumbar 07/21/2023    Substance abuse (HCC)     history of (no longer) 2024        Past Surgical History:   Procedure Laterality Date    PAIN MANAGEMENT PROCEDURE N/A 10/31/2024    INTRACEPT L4-L5-S1 performed by Rj Cotton MD at [facility] OR       Social History     Socioeconomic History    Marital status: Single     Spouse name: None    Number of children: None    Years of education: None    Highest education level: None   Tobacco

## 2025-03-05 ENCOUNTER — HOSPITAL ENCOUNTER (OUTPATIENT)
Dept: PAIN MANAGEMENT | Facility: CLINIC | Age: 50
Discharge: HOME | End: 2025-03-05
Payer: COMMERCIAL

## 2025-03-05 VITALS
WEIGHT: 250 LBS | HEIGHT: 74 IN | TEMPERATURE: 97.3 F | RESPIRATION RATE: 13 BRPM | DIASTOLIC BLOOD PRESSURE: 81 MMHG | SYSTOLIC BLOOD PRESSURE: 126 MMHG | HEART RATE: 56 BPM | OXYGEN SATURATION: 95 % | BODY MASS INDEX: 32.08 KG/M2

## 2025-03-05 DIAGNOSIS — M47.817 LUMBOSACRAL SPONDYLOSIS WITHOUT MYELOPATHY: Primary | ICD-10-CM

## 2025-03-05 DIAGNOSIS — R52 PAIN MANAGEMENT: ICD-10-CM

## 2025-03-05 PROCEDURE — 64493 INJ PARAVERT F JNT L/S 1 LEV: CPT

## 2025-03-05 PROCEDURE — 99152 MOD SED SAME PHYS/QHP 5/>YRS: CPT | Performed by: ANESTHESIOLOGY

## 2025-03-05 PROCEDURE — 6360000002 HC RX W HCPCS: Performed by: ANESTHESIOLOGY

## 2025-03-05 PROCEDURE — 64494 INJ PARAVERT F JNT L/S 2 LEV: CPT

## 2025-03-05 PROCEDURE — 64494 INJ PARAVERT F JNT L/S 2 LEV: CPT | Performed by: ANESTHESIOLOGY

## 2025-03-05 PROCEDURE — 64493 INJ PARAVERT F JNT L/S 1 LEV: CPT | Performed by: ANESTHESIOLOGY

## 2025-03-05 PROCEDURE — 6360000004 HC RX CONTRAST MEDICATION: Performed by: ANESTHESIOLOGY

## 2025-03-05 RX ORDER — BUPIVACAINE HYDROCHLORIDE 5 MG/ML
INJECTION, SOLUTION EPIDURAL; INTRACAUDAL
Status: COMPLETED | OUTPATIENT
Start: 2025-03-05 | End: 2025-03-05

## 2025-03-05 RX ORDER — LIDOCAINE HYDROCHLORIDE 10 MG/ML
INJECTION, SOLUTION EPIDURAL; INFILTRATION; INTRACAUDAL; PERINEURAL
Status: COMPLETED | OUTPATIENT
Start: 2025-03-05 | End: 2025-03-05

## 2025-03-05 RX ORDER — FENTANYL CITRATE 50 UG/ML
INJECTION, SOLUTION INTRAMUSCULAR; INTRAVENOUS
Status: COMPLETED | OUTPATIENT
Start: 2025-03-05 | End: 2025-03-05

## 2025-03-05 RX ORDER — MIDAZOLAM HYDROCHLORIDE 2 MG/2ML
INJECTION, SOLUTION INTRAMUSCULAR; INTRAVENOUS
Status: COMPLETED | OUTPATIENT
Start: 2025-03-05 | End: 2025-03-05

## 2025-03-05 RX ADMIN — IOHEXOL 3 ML: 180 INJECTION INTRAVENOUS at 10:22

## 2025-03-05 RX ADMIN — BUPIVACAINE HYDROCHLORIDE 6 ML: 5 INJECTION, SOLUTION EPIDURAL; INTRACAUDAL; PERINEURAL at 10:23

## 2025-03-05 RX ADMIN — LIDOCAINE HYDROCHLORIDE 5 ML: 10 INJECTION, SOLUTION EPIDURAL; INFILTRATION; INTRACAUDAL; PERINEURAL at 10:21

## 2025-03-05 RX ADMIN — MIDAZOLAM HYDROCHLORIDE 1 MG: 1 INJECTION, SOLUTION INTRAMUSCULAR; INTRAVENOUS at 10:20

## 2025-03-05 RX ADMIN — FENTANYL CITRATE 50 MCG: 50 INJECTION, SOLUTION INTRAMUSCULAR; INTRAVENOUS at 10:21

## 2025-03-05 ASSESSMENT — PAIN - FUNCTIONAL ASSESSMENT
PAIN_FUNCTIONAL_ASSESSMENT: 0-10
PAIN_FUNCTIONAL_ASSESSMENT: 0-10
PAIN_FUNCTIONAL_ASSESSMENT: PREVENTS OR INTERFERES SOME ACTIVE ACTIVITIES AND ADLS

## 2025-03-05 ASSESSMENT — PAIN DESCRIPTION - DESCRIPTORS: DESCRIPTORS: THROBBING;SHOOTING

## 2025-03-05 NOTE — OP NOTE
monitored by a Registered Nurse assigned to the Procedure Room  Monitoring included automated blood pressure, continuous EKG, Capnography and continuous pulse oximetry.   The detailed Conscious Record is permanently stored in the Hospital Information System.     The following is the conscious sedation record;  Start Time:  1017  End times:  1028  Duration:  11 minutes  MEDS GIVEN 1 MG VERSED AND 50 MCG FENTANYL

## 2025-03-05 NOTE — INTERVAL H&P NOTE
Update History & Physical    The patient's History and Physical of February 7, 2025 was reviewed with the patient and I examined the patient. There was no change. The surgical site was confirmed by the patient and me.     Plan: The risks, benefits, expected outcome, and alternative to the recommended procedure have been discussed with the patient. Patient understands and wants to proceed with the procedure.     ASA 2  MP 2    Electronically signed by Rj Cotton MD on 3/5/2025 at 10:03 AM

## 2025-03-05 NOTE — DISCHARGE INSTRUCTIONS
Discharge Instructions following Sedation or Anesthesia:  You have  received  a sedative/anesthetic therefore, you should not consume any alcoholic beverages for minimum of 12 hours.  Do not drive or operate machinery for 24 hours.  Do not sign legal documents for 24 hours.  Dizziness, drowsiness, and unsteadiness may occur.  Rest when need to.  Increase diet as tolerated.  Keep up on fluids if diet allows.      General Instructions:  Do not take a tub bath for 72 hours after procedure (this includes hot tubs and swimming pools).  You may shower, but avoid hot water to injection site.   Avoid strenuous activity TODAY especially if you experience dizziness.   Remove band-aid the next day.  Wash off any residual iodine   Do not use heat, heating pad, or any other heating device over the injection site for 3 days after the procedure.  If you experience pain after your procedure, you may continue with your current pain medication as prescribed.  (DO NOT INCREASE YOUR PAIN MEDICATION WITHOUT TALKING TO DOCTOR)  Soreness and pain at injection site is common, may use ice to reduce soreness.    Please complete pain diary as instructed.     Call [facility] at [phone] if you experience:   Fever, chills or temperature over 100    Vomiting, Headache, persistent stiff neck, nausea, blurred vision   Difficulty in urinating or unable to urinate with 8 hours   Increase in weakness, numbness or loss of function   Increased redness, swelling or drainage at the injection site

## 2025-03-06 ENCOUNTER — TELEPHONE (OUTPATIENT)
Dept: PAIN MANAGEMENT | Age: 50
End: 2025-03-06

## 2025-03-06 NOTE — TELEPHONE ENCOUNTER
Procedure: Bilateral Lumbar Medial Branch nerve Blocks at the transverse processes of L4, L5 and sacral     DOS: 3/5/25    Pain level before procedure with activity: 8/10    Pain with activity after procedure: 2/10    What activities done the day of procedure: went out to eat, walked around    What percentage of  pain relief from procedure did you receive: 80%     Success: No     OV Scheduled -  Virtual Visit okay'd with Dr. SHULTZ 3/11/25

## 2025-03-11 ENCOUNTER — TELEPHONE (OUTPATIENT)
Dept: PAIN MANAGEMENT | Age: 50
End: 2025-03-11

## 2025-03-11 ENCOUNTER — TELEMEDICINE (OUTPATIENT)
Dept: PAIN MANAGEMENT | Age: 50
End: 2025-03-11
Payer: COMMERCIAL

## 2025-03-11 DIAGNOSIS — M47.817 LUMBOSACRAL SPONDYLOSIS WITHOUT MYELOPATHY: Primary | ICD-10-CM

## 2025-03-11 PROCEDURE — G8427 DOCREV CUR MEDS BY ELIG CLIN: HCPCS | Performed by: ANESTHESIOLOGY

## 2025-03-11 PROCEDURE — G8417 CALC BMI ABV UP PARAM F/U: HCPCS | Performed by: ANESTHESIOLOGY

## 2025-03-11 PROCEDURE — 99213 OFFICE O/P EST LOW 20 MIN: CPT | Performed by: ANESTHESIOLOGY

## 2025-03-11 PROCEDURE — 1036F TOBACCO NON-USER: CPT | Performed by: ANESTHESIOLOGY

## 2025-03-11 RX ORDER — LEVOTHYROXINE SODIUM 75 UG/1
TABLET ORAL
COMMUNITY
Start: 2024-12-19

## 2025-03-11 RX ORDER — ERGOCALCIFEROL 1.25 MG/1
CAPSULE, LIQUID FILLED ORAL
COMMUNITY
Start: 2024-12-19

## 2025-03-11 ASSESSMENT — ENCOUNTER SYMPTOMS
GASTROINTESTINAL NEGATIVE: 1
BACK PAIN: 1
RESPIRATORY NEGATIVE: 1

## 2025-03-11 NOTE — TELEPHONE ENCOUNTER
I called patient to schedule Confirmatory MBB.  Patient states he has 3/19 off; I advised that isn't enough time for his insurance to pre-approve the procedure.  Patient states he will call back to schedule.

## 2025-03-11 NOTE — PROGRESS NOTES
The patient is a 49 y.o.Non- / non  male.    Chief Complaint   Patient presents with    Back Pain    Follow Up After Procedure     Failed MBB procedure       Adryan is a pleasant 49-year-old gentleman  Is seen in our clinic for predominantly axial chronic low back pain going on for several years, failed conservative measures with NSAID and physical therapy  Imaging showed facet arthropathy  Clinical presentation suggest facet mediated pain  Patient had bilateral diagnostic lumbar medial branch nerve block with fluoroscopy guidance at L4-5 and L5-S1 facet on March 5, 2025    He report 80% plus improvement in axial back pain with improved range of motion and activity tolerance  Relief lasted for a few hours consistent with the duration of local anesthetic and then symptoms returned back to the baseline    Based on his response to the diagnostic block, I think it is appropriate to proceed with confirmatory block at this point  If that also provide excellent outcome then we will proceed with radiofrequency ablation    Patient is here today for: Back pain failed procedure   Pain level: 9   Character: fairing up sharp pain   Radiating: towards the groin area   Weakness or numbness: Weakness and numbness   Aggravating Factors: laying in the same position, sitting, bending, standing   Alleviating Factors: nothing   Constant or intermitting: Constant   Bladder/bowel loss: no           Past Medical History:   Diagnosis Date    DDD (degenerative disc disease), lumbar 07/21/2023    Substance abuse (HCC)     history of (no longer) 2024        Past Surgical History:   Procedure Laterality Date    PAIN MANAGEMENT PROCEDURE N/A 10/31/2024    INTRACEPT L4-L5-S1 performed by Rj Cotton MD at [facility] OR       Social History     Socioeconomic History    Marital status: Single   Tobacco Use    Smoking status: Former     Current packs/day: 1.00     Average packs/day: 1 pack/day for 20.0 years (20.0 ttl pk-yrs)     Types:

## 2025-03-11 NOTE — H&P (VIEW-ONLY)
The patient is a 49 y.o.Non- / non  male.    Chief Complaint   Patient presents with    Back Pain    Follow Up After Procedure     Failed MBB procedure       Adryan is a pleasant 49-year-old gentleman  Is seen in our clinic for predominantly axial chronic low back pain going on for several years, failed conservative measures with NSAID and physical therapy  Imaging showed facet arthropathy  Clinical presentation suggest facet mediated pain  Patient had bilateral diagnostic lumbar medial branch nerve block with fluoroscopy guidance at L4-5 and L5-S1 facet on March 5, 2025    He report 80% plus improvement in axial back pain with improved range of motion and activity tolerance  Relief lasted for a few hours consistent with the duration of local anesthetic and then symptoms returned back to the baseline    Based on his response to the diagnostic block, I think it is appropriate to proceed with confirmatory block at this point  If that also provide excellent outcome then we will proceed with radiofrequency ablation    Patient is here today for: Back pain failed procedure   Pain level: 9   Character: fairing up sharp pain   Radiating: towards the groin area   Weakness or numbness: Weakness and numbness   Aggravating Factors: laying in the same position, sitting, bending, standing   Alleviating Factors: nothing   Constant or intermitting: Constant   Bladder/bowel loss: no           Past Medical History:   Diagnosis Date    DDD (degenerative disc disease), lumbar 07/21/2023    Substance abuse (HCC)     history of (no longer) 2024        Past Surgical History:   Procedure Laterality Date    PAIN MANAGEMENT PROCEDURE N/A 10/31/2024    INTRACEPT L4-L5-S1 performed by Rj Cotton MD at [facility] OR       Social History     Socioeconomic History    Marital status: Single   Tobacco Use    Smoking status: Former     Current packs/day: 1.00     Average packs/day: 1 pack/day for 20.0 years (20.0 ttl pk-yrs)     Types:

## 2025-03-26 ENCOUNTER — HOSPITAL ENCOUNTER (OUTPATIENT)
Dept: PAIN MANAGEMENT | Facility: CLINIC | Age: 50
Discharge: HOME | End: 2025-03-26
Payer: COMMERCIAL

## 2025-03-26 VITALS
DIASTOLIC BLOOD PRESSURE: 80 MMHG | RESPIRATION RATE: 10 BRPM | BODY MASS INDEX: 32.08 KG/M2 | WEIGHT: 250 LBS | HEIGHT: 74 IN | SYSTOLIC BLOOD PRESSURE: 128 MMHG | TEMPERATURE: 98.1 F | OXYGEN SATURATION: 98 % | HEART RATE: 64 BPM

## 2025-03-26 DIAGNOSIS — M47.817 LUMBOSACRAL SPONDYLOSIS WITHOUT MYELOPATHY: Primary | ICD-10-CM

## 2025-03-26 DIAGNOSIS — R52 PAIN MANAGEMENT: ICD-10-CM

## 2025-03-26 PROCEDURE — 64494 INJ PARAVERT F JNT L/S 2 LEV: CPT | Performed by: ANESTHESIOLOGY

## 2025-03-26 PROCEDURE — 64493 INJ PARAVERT F JNT L/S 1 LEV: CPT | Performed by: ANESTHESIOLOGY

## 2025-03-26 PROCEDURE — 64494 INJ PARAVERT F JNT L/S 2 LEV: CPT

## 2025-03-26 PROCEDURE — 6360000002 HC RX W HCPCS: Performed by: ANESTHESIOLOGY

## 2025-03-26 PROCEDURE — 6360000004 HC RX CONTRAST MEDICATION: Performed by: ANESTHESIOLOGY

## 2025-03-26 PROCEDURE — 99152 MOD SED SAME PHYS/QHP 5/>YRS: CPT | Performed by: ANESTHESIOLOGY

## 2025-03-26 PROCEDURE — 64493 INJ PARAVERT F JNT L/S 1 LEV: CPT

## 2025-03-26 RX ORDER — FENTANYL CITRATE 50 UG/ML
INJECTION, SOLUTION INTRAMUSCULAR; INTRAVENOUS
Status: COMPLETED | OUTPATIENT
Start: 2025-03-26 | End: 2025-03-26

## 2025-03-26 RX ORDER — BUPIVACAINE HYDROCHLORIDE 5 MG/ML
INJECTION, SOLUTION EPIDURAL; INTRACAUDAL; PERINEURAL
Status: COMPLETED | OUTPATIENT
Start: 2025-03-26 | End: 2025-03-26

## 2025-03-26 RX ORDER — LIDOCAINE HYDROCHLORIDE 10 MG/ML
INJECTION, SOLUTION EPIDURAL; INFILTRATION; INTRACAUDAL; PERINEURAL
Status: COMPLETED | OUTPATIENT
Start: 2025-03-26 | End: 2025-03-26

## 2025-03-26 RX ORDER — MIDAZOLAM HYDROCHLORIDE 2 MG/2ML
INJECTION, SOLUTION INTRAMUSCULAR; INTRAVENOUS
Status: COMPLETED | OUTPATIENT
Start: 2025-03-26 | End: 2025-03-26

## 2025-03-26 RX ADMIN — LIDOCAINE HYDROCHLORIDE 5 ML: 10 INJECTION, SOLUTION EPIDURAL; INFILTRATION; INTRACAUDAL; PERINEURAL at 15:34

## 2025-03-26 RX ADMIN — FENTANYL CITRATE 50 MCG: 50 INJECTION, SOLUTION INTRAMUSCULAR; INTRAVENOUS at 15:33

## 2025-03-26 RX ADMIN — IOHEXOL 3 ML: 180 INJECTION INTRAVENOUS at 15:36

## 2025-03-26 RX ADMIN — MIDAZOLAM HYDROCHLORIDE 1 MG: 1 INJECTION, SOLUTION INTRAMUSCULAR; INTRAVENOUS at 15:33

## 2025-03-26 RX ADMIN — BUPIVACAINE HYDROCHLORIDE 6 ML: 5 INJECTION, SOLUTION EPIDURAL; INTRACAUDAL; PERINEURAL at 15:37

## 2025-03-26 ASSESSMENT — PAIN DESCRIPTION - DESCRIPTORS: DESCRIPTORS: SHOOTING;THROBBING

## 2025-03-26 NOTE — DISCHARGE INSTRUCTIONS
Discharge Instructions following Sedation or Anesthesia:  You have  received  a sedative/anesthetic therefore, you should not consume any alcoholic beverages for minimum of 12 hours.  Do not drive or operate machinery for 24 hours.  Do not sign legal documents for 24 hours.  Dizziness, drowsiness, and unsteadiness may occur.  Rest when need to.  Increase diet as tolerated.  Keep up on fluids if diet allows.      General Instructions:  Do not take a tub bath for 72 hours after procedure (this includes hot tubs and swimming pools).  You may shower, but avoid hot water to injection site.   Avoid strenuous activity TODAY especially if you experience dizziness.   Remove band-aid the next day.  Wash off any residual iodine   Do not use heat, heating pad, or any other heating device over the injection site for 3 days after the procedure.  If you experience pain after your procedure, you may continue with your current pain medication as prescribed.  (DO NOT INCREASE YOUR PAIN MEDICATION WITHOUT TALKING TO DOCTOR)  Soreness and pain at injection site is common, may use ice to reduce soreness.    Please complete pain diary as instructed.     Call [facility] at [phone] if you experience:   Fever, chills or temperature over 100    Vomiting, Headache, persistent stiff neck, nausea, blurred vision   Difficulty in urinating or unable to urinate with 8 hours   Increase in weakness, numbness or loss of function   Increased redness, swelling or drainage at the injection site

## 2025-03-26 NOTE — OP NOTE
Patient Name: Adryan Lorenzo   YOB: 1975  Room/Bed: Room/bed info not found  Medical Record Number: [id number]  Date: 3/26/2025       Sedation/ Anesthesia Plan:   intravenous sedation   as needed.    Medications Planned:   midazolam (Versed) / Fentanyl  Intravenously  as needed.    Preoperative Diagnosis: Lumbar spondylosis w/o myelopathy or radiculopathy  Postoperative Diagnosis: Lumbar spondylosis w/o myelopathy or radiculopathy  Blood Loss: none    Procedure Performed:  Bilateral Lumbar Medial Branch nerve Blocks at the transverse processes of L4, L5 and sacral  ala under fluoroscopy guidance    Procedure:      The Patient was seen in the preop area, chart was reviewed, informed consent was obtained. Patient was taken to procedure room and was placed in prone position. Vital signs were monitored through out the  Procedure. A time out was completed.  The skin over the back was prepped and draped in sterile manner.     The target point was marked at the junction of Transverse process and superior articular process at the target levels.  Skin and deep tissues were anesthetized with 1 % lidocaine. A 25-gauge needlele was advanced to the target spots under fluoroscopy guidance in AP / Lateral and Oblique views.     Then after negative aspiration contrast dye was injected with live fluoroscopy in AP views that showed  spread of the contrast with no epidural space and no vascular runoff or intrathecal spread.    Finally 0.5 ml of treatment solution 0.5% bupivacaine  was injected at each level.  The needle was removed and a Band-Aid was placed over the needle  insertion site.  The patient's vital signs remained stable and the patient tolerated the procedure well.      Electronically signed by Rj Cotton MD on 3/26/2025 at 3:59 PM    SEDATION NOTE:    ASA CLASSIFICATION  2  MP   CLASSIFICATION  2    Moderate intravenous conscious sedation was supervised by Dr. Cotton  The patient was independently

## 2025-03-26 NOTE — INTERVAL H&P NOTE
Update History & Physical    The patient's History and Physical of March 11, 2025 was reviewed with the patient and I examined the patient. There was no change. The surgical site was confirmed by the patient and me.     Plan: The risks, benefits, expected outcome, and alternative to the recommended procedure have been discussed with the patient. Patient understands and wants to proceed with the procedure.     ASA 2  MP 2    Electronically signed by Rj Cotton MD on 3/26/2025 at 3:23 PM

## 2025-03-27 ENCOUNTER — TELEPHONE (OUTPATIENT)
Dept: PAIN MANAGEMENT | Age: 50
End: 2025-03-27

## 2025-03-27 DIAGNOSIS — M47.817 LUMBOSACRAL SPONDYLOSIS WITHOUT MYELOPATHY: Primary | ICD-10-CM

## 2025-03-27 DIAGNOSIS — G89.29 CHRONIC BILATERAL LOW BACK PAIN, UNSPECIFIED WHETHER SCIATICA PRESENT: ICD-10-CM

## 2025-03-27 DIAGNOSIS — M54.50 CHRONIC BILATERAL LOW BACK PAIN, UNSPECIFIED WHETHER SCIATICA PRESENT: ICD-10-CM

## 2025-03-27 NOTE — TELEPHONE ENCOUNTER
Procedure: Bilateral Lumbar Medial Branch nerve Blocks at the transverse processes of L4, L5 and sacral  ala under fluoroscopy guidance     DOS: 3/26/25    Pain level before procedure with activity:  9/10    Pain with activity after procedure: 0/10    What activities done the day of procedure:  Drove home, walked, went up stairs and played with the dog.    What percentage of  pain relief from procedure did you receive: 100%    Success:     OR  Scheduled  4/16/25

## 2025-04-16 ENCOUNTER — HOSPITAL ENCOUNTER (OUTPATIENT)
Dept: PAIN MANAGEMENT | Facility: CLINIC | Age: 50
Discharge: HOME | End: 2025-04-16
Payer: COMMERCIAL

## 2025-04-16 VITALS
RESPIRATION RATE: 12 BRPM | DIASTOLIC BLOOD PRESSURE: 92 MMHG | TEMPERATURE: 97.9 F | SYSTOLIC BLOOD PRESSURE: 156 MMHG | OXYGEN SATURATION: 94 % | HEART RATE: 74 BPM

## 2025-04-16 DIAGNOSIS — M47.817 LUMBOSACRAL SPONDYLOSIS WITHOUT MYELOPATHY: Primary | ICD-10-CM

## 2025-04-16 DIAGNOSIS — R52 PAIN MANAGEMENT: ICD-10-CM

## 2025-04-16 PROCEDURE — 64635 DESTROY LUMB/SAC FACET JNT: CPT

## 2025-04-16 PROCEDURE — 99152 MOD SED SAME PHYS/QHP 5/>YRS: CPT | Performed by: ANESTHESIOLOGY

## 2025-04-16 PROCEDURE — 64636 DESTROY L/S FACET JNT ADDL: CPT

## 2025-04-16 PROCEDURE — 64635 DESTROY LUMB/SAC FACET JNT: CPT | Performed by: ANESTHESIOLOGY

## 2025-04-16 PROCEDURE — 6360000002 HC RX W HCPCS: Performed by: ANESTHESIOLOGY

## 2025-04-16 PROCEDURE — 64636 DESTROY L/S FACET JNT ADDL: CPT | Performed by: ANESTHESIOLOGY

## 2025-04-16 RX ORDER — MIDAZOLAM HYDROCHLORIDE 2 MG/2ML
INJECTION, SOLUTION INTRAMUSCULAR; INTRAVENOUS
Status: COMPLETED | OUTPATIENT
Start: 2025-04-16 | End: 2025-04-16

## 2025-04-16 RX ORDER — LIDOCAINE HYDROCHLORIDE 40 MG/ML
INJECTION, SOLUTION RETROBULBAR
Status: COMPLETED | OUTPATIENT
Start: 2025-04-16 | End: 2025-04-16

## 2025-04-16 RX ORDER — FENTANYL CITRATE 50 UG/ML
INJECTION, SOLUTION INTRAMUSCULAR; INTRAVENOUS
Status: COMPLETED | OUTPATIENT
Start: 2025-04-16 | End: 2025-04-16

## 2025-04-16 RX ORDER — LIDOCAINE HYDROCHLORIDE 10 MG/ML
INJECTION, SOLUTION EPIDURAL; INFILTRATION; INTRACAUDAL; PERINEURAL
Status: COMPLETED | OUTPATIENT
Start: 2025-04-16 | End: 2025-04-16

## 2025-04-16 RX ADMIN — LIDOCAINE HYDROCHLORIDE 10 ML: 10 INJECTION, SOLUTION EPIDURAL; INFILTRATION; INTRACAUDAL; PERINEURAL at 09:37

## 2025-04-16 RX ADMIN — FENTANYL CITRATE 50 MCG: 50 INJECTION, SOLUTION INTRAMUSCULAR; INTRAVENOUS at 09:36

## 2025-04-16 RX ADMIN — LIDOCAINE HYDROCHLORIDE 5 ML: 40 INJECTION, SOLUTION RETROBULBAR at 09:41

## 2025-04-16 RX ADMIN — FENTANYL CITRATE 50 MCG: 50 INJECTION, SOLUTION INTRAMUSCULAR; INTRAVENOUS at 09:43

## 2025-04-16 RX ADMIN — MIDAZOLAM HYDROCHLORIDE 2 MG: 1 INJECTION, SOLUTION INTRAMUSCULAR; INTRAVENOUS at 09:36

## 2025-04-16 ASSESSMENT — PAIN SCALES - GENERAL: PAINLEVEL_OUTOF10: 8

## 2025-04-16 ASSESSMENT — PAIN DESCRIPTION - ORIENTATION: ORIENTATION: RIGHT;LEFT;LOWER

## 2025-04-16 ASSESSMENT — PAIN DESCRIPTION - DESCRIPTORS: DESCRIPTORS: CRUSHING;SHARP

## 2025-04-16 ASSESSMENT — PAIN - FUNCTIONAL ASSESSMENT
PAIN_FUNCTIONAL_ASSESSMENT: PREVENTS OR INTERFERES WITH MANY ACTIVE NOT PASSIVE ACTIVITIES
PAIN_FUNCTIONAL_ASSESSMENT: NONE - DENIES PAIN

## 2025-04-16 ASSESSMENT — PAIN DESCRIPTION - FREQUENCY: FREQUENCY: CONTINUOUS

## 2025-04-16 ASSESSMENT — PAIN DESCRIPTION - ONSET: ONSET: AWAKENED FROM SLEEP

## 2025-04-16 ASSESSMENT — PAIN DESCRIPTION - LOCATION: LOCATION: BACK

## 2025-04-16 NOTE — DISCHARGE INSTRUCTIONS
Discharge Instructions following Sedation or Anesthesia:  You have  received  a sedative/anesthetic therefore, you should not consume any alcoholic beverages for minimum of 12 hours.  Do not drive or operate machinery for 24 hours.  Do not sign legal documents for 24 hours.  Dizziness, drowsiness, and unsteadiness may occur.  Rest when need to.  Increase diet as tolerated.  Keep up on fluids if diet allows.      General Instructions:  Do not take a tub bath for 72 hours after procedure (this includes hot tubs and swimming pools).  You may shower, but avoid hot water to injection site.   Avoid strenuous activity TODAY especially if you experience dizziness.   Remove band-aid the next day.  Wash off any residual iodine   Do not use heat, heating pad, or any other heating device over the injection site for 3 days after the procedure.  If you experience pain after your procedure, you may continue with your current pain medication as prescribed.  (DO NOT INCREASE YOUR PAIN MEDICATION WITHOUT TALKING TO DOCTOR)  Soreness and pain at injection site is common, may use ice to reduce soreness.    Call [facility] at [phone] if you experience:   Fever, chills or temperature over 100    Vomiting, Headache, persistent stiff neck, nausea, blurred vision   Difficulty in urinating or unable to urinate with 8 hours   Increase in weakness, numbness or loss of function   Increased redness, swelling or drainage at the injection site

## 2025-04-16 NOTE — H&P
Pain Pre-Op H&P Note    Rj Cotton MD    HPI: Adryan Lorenzo  presents with   axial chronic low back pain going on for several years, failed conservative measures with NSAID and physical therapy  Imaging showed facet arthropathy  Clinical presentation suggest facet mediated pain    Past Medical History:   Diagnosis Date    DDD (degenerative disc disease), lumbar 07/21/2023    Substance abuse     history of (no longer) 2024       Past Surgical History:   Procedure Laterality Date    PAIN MANAGEMENT PROCEDURE N/A 10/31/2024    INTRACEPT L4-L5-S1 performed by Rj Cotton MD at [facility] OR       History reviewed. No pertinent family history.    No Known Allergies    No current outpatient medications on file.    Social History     Tobacco Use    Smoking status: Former     Current packs/day: 1.00     Average packs/day: 1 pack/day for 20.0 years (20.0 ttl pk-yrs)     Types: Cigarettes    Smokeless tobacco: Never    Tobacco comments:     Currently vapes   Substance Use Topics    Alcohol use: No       Review of Systems:   Focused review of systems was performed, and negative as pertinent to diagnosis, except as stated in HPI.      Physical Exam  Constitutional:       Appearance: Normal appearance.   Pulmonary:      Effort: Pulmonary effort is normal.   Neurological:      Mental Status: alert.   Psychiatric:         Attention and Perception: Attention and perception normal.         Mood and Affect: Mood and affect normal.   Cardiovascular:      Rate: Normal rate.         ASA: 2          Mallampati: 2       Patient's current physical status, medications, medical history, and HPI have been reviewed and updated as appropriate on this date: 04/16/25    Risk/Benefit(s): The risks, benefits, alternatives, and potential complications have been discussed with the patient/family and informed consent has been obtained for the procedure/sedation.    Diagnosis:   1. Lumbosacral spondylosis without myelopathy              Plan:

## 2025-05-16 ENCOUNTER — OFFICE VISIT (OUTPATIENT)
Dept: PAIN MANAGEMENT | Age: 50
End: 2025-05-16
Payer: COMMERCIAL

## 2025-05-16 VITALS — WEIGHT: 250 LBS | HEIGHT: 74 IN | BODY MASS INDEX: 32.08 KG/M2

## 2025-05-16 DIAGNOSIS — S39.012D STRAIN OF LUMBAR REGION, SUBSEQUENT ENCOUNTER: ICD-10-CM

## 2025-05-16 DIAGNOSIS — M54.51 VERTEBROGENIC LOW BACK PAIN: ICD-10-CM

## 2025-05-16 DIAGNOSIS — M51.369 DEGENERATION OF INTERVERTEBRAL DISC OF LUMBAR REGION, UNSPECIFIED WHETHER PAIN PRESENT: ICD-10-CM

## 2025-05-16 DIAGNOSIS — M47.817 LUMBOSACRAL SPONDYLOSIS WITHOUT MYELOPATHY: Primary | ICD-10-CM

## 2025-05-16 PROCEDURE — 1036F TOBACCO NON-USER: CPT | Performed by: NURSE PRACTITIONER

## 2025-05-16 PROCEDURE — 99213 OFFICE O/P EST LOW 20 MIN: CPT | Performed by: NURSE PRACTITIONER

## 2025-05-16 PROCEDURE — G8427 DOCREV CUR MEDS BY ELIG CLIN: HCPCS | Performed by: NURSE PRACTITIONER

## 2025-05-16 PROCEDURE — G8417 CALC BMI ABV UP PARAM F/U: HCPCS | Performed by: NURSE PRACTITIONER

## 2025-05-16 RX ORDER — CYCLOBENZAPRINE HCL 10 MG
10 TABLET ORAL NIGHTLY PRN
Qty: 30 TABLET | Refills: 1 | Status: SHIPPED | OUTPATIENT
Start: 2025-05-16 | End: 2025-07-15

## 2025-05-16 ASSESSMENT — ENCOUNTER SYMPTOMS
BACK PAIN: 1
BOWEL INCONTINENCE: 0
SHORTNESS OF BREATH: 0

## 2025-05-16 NOTE — PROGRESS NOTES
Chief Complaint   Patient presents with    Back Pain    Follow Up After Procedure     Radiofrequency ablation of median branches at the Transverse processes of L4, L5 AND SACRAL ALA for L4/5 AND L5/S1 facet joints on Bilateral under fluoroscopic guidance with IV sedation       [facility]     Patient is a very pleasant 48-year-old gentleman physically active, history of 5+ year history of low back pain located in the lower lumbosacral area across midline on both sides aggravated with twisting, turning, bending and extension  Patient has done multiple course of physical therapy and continues to do home exercises.  4/21/2023 -MRI lumbar spine showed lower lumbar spine degenerative changes and facet arthropathy  He has failed NSAIDs and muscle relaxant previously.   Pain is affecting quality of life and activity level.  Oswestry disability index to moderate disability associated with pain    10/31/2024 - Intracept procedure and reports no significant improvement in pain.   Successful lumbar MBBs with greater than 80% improvement in pain.    He tried cyclobenzaprine in the past with some relief.  Patient would like refill to take as needed.    Patient is following up from bilateral lumbar medial branch radiofrequency ablation at L4-5 and L5-S1 on 4/16/2025. He reports 60-70% improvement in pain and function. He is satisfied, stating he \"still has good days and bad days, more frequent good days now\" and feeling well overall.       Back Pain  This is a chronic problem. The current episode started more than 1 year ago. The problem occurs intermittently. The problem is unchanged. The pain is present in the lumbar spine. The quality of the pain is described as aching, burning, cramping, shooting and stabbing. The pain does not radiate. The pain is at a severity of 0/10. The patient is experiencing no pain. The pain is Worse during the day. The symptoms are aggravated by bending, standing, twisting, sitting, lying down and

## 2025-08-22 ENCOUNTER — OFFICE VISIT (OUTPATIENT)
Dept: PAIN MANAGEMENT | Age: 50
End: 2025-08-22
Payer: COMMERCIAL

## 2025-08-22 VITALS — BODY MASS INDEX: 32.08 KG/M2 | WEIGHT: 250 LBS | HEIGHT: 74 IN

## 2025-08-22 DIAGNOSIS — M47.817 LUMBOSACRAL SPONDYLOSIS WITHOUT MYELOPATHY: Primary | ICD-10-CM

## 2025-08-22 DIAGNOSIS — M54.51 VERTEBROGENIC LOW BACK PAIN: ICD-10-CM

## 2025-08-22 DIAGNOSIS — S39.012D STRAIN OF LUMBAR REGION, SUBSEQUENT ENCOUNTER: ICD-10-CM

## 2025-08-22 DIAGNOSIS — M51.369 DEGENERATION OF INTERVERTEBRAL DISC OF LUMBAR REGION, UNSPECIFIED WHETHER PAIN PRESENT: ICD-10-CM

## 2025-08-22 PROCEDURE — G8427 DOCREV CUR MEDS BY ELIG CLIN: HCPCS | Performed by: NURSE PRACTITIONER

## 2025-08-22 PROCEDURE — G8417 CALC BMI ABV UP PARAM F/U: HCPCS | Performed by: NURSE PRACTITIONER

## 2025-08-22 PROCEDURE — 1036F TOBACCO NON-USER: CPT | Performed by: NURSE PRACTITIONER

## 2025-08-22 PROCEDURE — 99213 OFFICE O/P EST LOW 20 MIN: CPT | Performed by: NURSE PRACTITIONER

## 2025-08-22 RX ORDER — MELOXICAM 7.5 MG/1
TABLET ORAL
COMMUNITY
Start: 2025-06-14 | End: 2025-08-22

## 2025-08-22 RX ORDER — ERGOCALCIFEROL 1.25 MG/1
1 CAPSULE ORAL
COMMUNITY
Start: 2024-12-19

## 2025-08-22 RX ORDER — KETOROLAC TROMETHAMINE 10 MG/1
TABLET, FILM COATED ORAL
COMMUNITY
Start: 2025-07-02 | End: 2025-08-22

## 2025-08-22 RX ORDER — OXYCODONE HYDROCHLORIDE 5 MG/1
TABLET ORAL
COMMUNITY
Start: 2025-07-02 | End: 2025-08-22

## 2025-08-22 RX ORDER — LEVOTHYROXINE SODIUM 88 UG/1
TABLET ORAL
COMMUNITY
Start: 2025-07-18

## 2025-08-22 RX ORDER — TRAMADOL HYDROCHLORIDE 50 MG/1
TABLET ORAL
COMMUNITY
Start: 2025-06-13 | End: 2025-08-22

## 2025-08-22 RX ORDER — ASPIRIN 81 MG/1
TABLET, COATED ORAL
COMMUNITY
Start: 2025-07-02

## 2025-08-22 RX ORDER — CYCLOBENZAPRINE HCL 10 MG
10 TABLET ORAL NIGHTLY PRN
Qty: 30 TABLET | Refills: 1 | Status: SHIPPED | OUTPATIENT
Start: 2025-08-22 | End: 2025-10-21

## 2025-08-22 RX ORDER — IBUPROFEN 800 MG/1
TABLET, FILM COATED ORAL
COMMUNITY
Start: 2025-08-07

## 2025-08-22 RX ORDER — AMOXICILLIN 500 MG/1
CAPSULE ORAL
COMMUNITY
Start: 2025-08-07 | End: 2025-08-22

## 2025-08-22 RX ORDER — PREDNISONE 20 MG/1
TABLET ORAL
COMMUNITY
Start: 2025-05-16 | End: 2025-08-22

## 2025-08-22 RX ORDER — LEVOTHYROXINE SODIUM 75 UG/1
TABLET ORAL
COMMUNITY
Start: 2024-12-19

## 2025-08-22 ASSESSMENT — ENCOUNTER SYMPTOMS
SHORTNESS OF BREATH: 0
BOWEL INCONTINENCE: 0
BACK PAIN: 1

## (undated) DEVICE — LIQUIBAND RAPID ADHESIVE 36/CS 0.8ML: Brand: MEDLINE

## (undated) DEVICE — COVER,MAYO STAND,STERILE: Brand: MEDLINE

## (undated) DEVICE — MERCY HEALTH ST CHARLES: Brand: MEDLINE INDUSTRIES, INC.

## (undated) DEVICE — COVER LT HNDL BLU PLAS

## (undated) DEVICE — GAUZE,SPONGE,4"X4",16PLY,XRAY,STRL,LF: Brand: MEDLINE

## (undated) DEVICE — GLOVE SURG SZ 8 THK118MIL BLK LTX FREE POLYISOPRENE BEAD CUF

## (undated) DEVICE — SYRINGE MED 10ML LUERLOCK TIP W/O SFTY DISP

## (undated) DEVICE — STRIP,CLOSURE,WOUND,MEDI-STRIP,1/2X4: Brand: MEDLINE

## (undated) DEVICE — SHEET, T, LAPAROTOMY, STERILE: Brand: MEDLINE

## (undated) DEVICE — INTRACEPT ACCESS INSTRUMENTS 2 VB: Brand: INTRACEPT

## (undated) DEVICE — NEEDLE HYPO 25GA L1.5IN BLU POLYPR HUB S STL REG BVL STR

## (undated) DEVICE — 1010 S-DRAPE TOWEL DRAPE 10/BX: Brand: STERI-DRAPE™

## (undated) DEVICE — BLADE,CARBON-STEEL,10,STRL,DISPOSABLE,TB: Brand: MEDLINE

## (undated) DEVICE — SNAP KAP: Brand: UNBRANDED

## (undated) DEVICE — SOLUTION IRRIG 1000ML 0.9% SOD CHL USP POUR PLAS BTL

## (undated) DEVICE — GOWN,AURORA,NONREINFORCED,LARGE: Brand: MEDLINE

## (undated) DEVICE — MARKER,SKIN,WI/RULER AND LABELS: Brand: MEDLINE

## (undated) DEVICE — LABEL MED DRUG DESCRIPTION MP STL

## (undated) DEVICE — SHEET,DRAPE,53X77,STERILE: Brand: MEDLINE

## (undated) DEVICE — COUNTER NDL 10 COUNT HLD 20 FOAM BLK SGL MAG

## (undated) DEVICE — NEEDLE, QUINCKE, 22GX3.5": Brand: MEDLINE

## (undated) DEVICE — COVER,TABLE,60X90,STERILE: Brand: MEDLINE

## (undated) DEVICE — INTRACEPT RF PROBE: Brand: INTRACEPT RF PROBE

## (undated) DEVICE — TOWEL,OR,DSP,ST,BLUE,STD,6/PK,12PK/CS: Brand: MEDLINE

## (undated) DEVICE — APPLICATOR MEDICATED 26 CC SOLUTION HI LT ORNG CHLORAPREP

## (undated) DEVICE — BLANKET WRM W29.9XL79.1IN UP BODY FORC AIR MISTRAL-AIR